# Patient Record
Sex: FEMALE | Race: WHITE | NOT HISPANIC OR LATINO | Employment: FULL TIME | URBAN - NONMETROPOLITAN AREA
[De-identification: names, ages, dates, MRNs, and addresses within clinical notes are randomized per-mention and may not be internally consistent; named-entity substitution may affect disease eponyms.]

---

## 2017-03-05 ENCOUNTER — HOSPITAL ENCOUNTER (EMERGENCY)
Facility: HOSPITAL | Age: 62
Discharge: HOME | End: 2017-03-05
Attending: EMERGENCY MEDICINE | Admitting: EMERGENCY MEDICINE

## 2017-03-05 VITALS
OXYGEN SATURATION: 99 % | HEIGHT: 64 IN | WEIGHT: 135 LBS | BODY MASS INDEX: 23.05 KG/M2 | DIASTOLIC BLOOD PRESSURE: 69 MMHG | HEART RATE: 70 BPM | RESPIRATION RATE: 16 BRPM | TEMPERATURE: 98 F | SYSTOLIC BLOOD PRESSURE: 158 MMHG

## 2017-03-05 DIAGNOSIS — E87.6 HYPOKALEMIA: ICD-10-CM

## 2017-03-05 DIAGNOSIS — K52.9 ACUTE GASTROENTERITIS: Primary | ICD-10-CM

## 2017-03-05 LAB
ALBUMIN SERPL-MCNC: 4.9 G/DL (ref 3.4–4.8)
ALBUMIN/GLOB SERPL: 1.3 G/DL (ref 1.5–2.5)
ALP SERPL-CCNC: 87 U/L (ref 35–104)
ALT SERPL W P-5'-P-CCNC: 16 U/L (ref 10–36)
AMPHET+METHAMPHET UR QL: NEGATIVE
AMYLASE SERPL-CCNC: 40 U/L (ref 28–100)
ANION GAP SERPL CALCULATED.3IONS-SCNC: 9.8 MMOL/L (ref 3.6–11.2)
AST SERPL-CCNC: 25 U/L (ref 10–30)
BACTERIA UR QL AUTO: ABNORMAL /HPF
BARBITURATES UR QL SCN: NEGATIVE
BASOPHILS # BLD AUTO: 0 10*3/MM3 (ref 0–0.3)
BASOPHILS NFR BLD AUTO: 0 % (ref 0–2)
BENZODIAZ UR QL SCN: POSITIVE
BILIRUB SERPL-MCNC: 0.7 MG/DL (ref 0.2–1.8)
BILIRUB UR QL STRIP: NEGATIVE
BUN BLD-MCNC: 17 MG/DL (ref 7–21)
BUN/CREAT SERPL: 21.3 (ref 7–25)
CALCIUM SPEC-SCNC: 10.6 MG/DL (ref 7.7–10)
CANNABINOIDS SERPL QL: POSITIVE
CHLORIDE SERPL-SCNC: 107 MMOL/L (ref 99–112)
CLARITY UR: CLEAR
CO2 SERPL-SCNC: 24.2 MMOL/L (ref 24.3–31.9)
COCAINE UR QL: NEGATIVE
COLOR UR: ABNORMAL
CREAT BLD-MCNC: 0.8 MG/DL (ref 0.43–1.29)
DEPRECATED RDW RBC AUTO: 38.5 FL (ref 37–54)
EOSINOPHIL # BLD AUTO: 0 10*3/MM3 (ref 0–0.7)
EOSINOPHIL NFR BLD AUTO: 0 % (ref 0–5)
ERYTHROCYTE [DISTWIDTH] IN BLOOD BY AUTOMATED COUNT: 12.6 % (ref 11.5–14.5)
FLUAV AG NPH QL: NEGATIVE
FLUBV AG NPH QL IA: NEGATIVE
GFR SERPL CREATININE-BSD FRML MDRD: 73 ML/MIN/1.73
GLOBULIN UR ELPH-MCNC: 3.7 GM/DL
GLUCOSE BLD-MCNC: 172 MG/DL (ref 70–110)
GLUCOSE UR STRIP-MCNC: ABNORMAL MG/DL
HCT VFR BLD AUTO: 41.1 % (ref 37–47)
HGB BLD-MCNC: 14.2 G/DL (ref 12–16)
HGB UR QL STRIP.AUTO: ABNORMAL
IMM GRANULOCYTES # BLD: 0.04 10*3/MM3 (ref 0–0.03)
IMM GRANULOCYTES NFR BLD: 0.3 % (ref 0–0.5)
KETONES UR QL STRIP: ABNORMAL
LEUKOCYTE ESTERASE UR QL STRIP.AUTO: ABNORMAL
LIPASE SERPL-CCNC: 27 U/L (ref 13–60)
LYMPHOCYTES # BLD AUTO: 0.82 10*3/MM3 (ref 1–3)
LYMPHOCYTES NFR BLD AUTO: 6 % (ref 21–51)
MCH RBC QN AUTO: 29.3 PG (ref 27–33)
MCHC RBC AUTO-ENTMCNC: 34.5 G/DL (ref 33–37)
MCV RBC AUTO: 84.7 FL (ref 80–94)
METHADONE UR QL SCN: NEGATIVE
MONOCYTES # BLD AUTO: 0.4 10*3/MM3 (ref 0.1–0.9)
MONOCYTES NFR BLD AUTO: 2.9 % (ref 0–10)
NEUTROPHILS # BLD AUTO: 12.36 10*3/MM3 (ref 1.4–6.5)
NEUTROPHILS NFR BLD AUTO: 90.8 % (ref 30–70)
NITRITE UR QL STRIP: NEGATIVE
OPIATES UR QL: POSITIVE
OSMOLALITY SERPL CALC.SUM OF ELEC: 286.9 MOSM/KG (ref 273–305)
PCP UR QL SCN: NEGATIVE
PH UR STRIP.AUTO: 6.5 [PH] (ref 5–8)
PLATELET # BLD AUTO: 240 10*3/MM3 (ref 130–400)
PMV BLD AUTO: 11.1 FL (ref 6–10)
POTASSIUM BLD-SCNC: 3.4 MMOL/L (ref 3.5–5.3)
PROPOXYPH UR QL: NEGATIVE
PROT SERPL-MCNC: 8.6 G/DL (ref 6–8)
PROT UR QL STRIP: ABNORMAL
RBC # BLD AUTO: 4.85 10*6/MM3 (ref 4.2–5.4)
RBC # UR: ABNORMAL /HPF
REF LAB TEST METHOD: ABNORMAL
SODIUM BLD-SCNC: 141 MMOL/L (ref 135–153)
SP GR UR STRIP: >1.03 (ref 1–1.03)
SQUAMOUS #/AREA URNS HPF: ABNORMAL /HPF
UROBILINOGEN UR QL STRIP: ABNORMAL
WBC NRBC COR # BLD: 13.62 10*3/MM3 (ref 4.5–12.5)
WBC UR QL AUTO: ABNORMAL /HPF

## 2017-03-05 PROCEDURE — 96361 HYDRATE IV INFUSION ADD-ON: CPT

## 2017-03-05 PROCEDURE — 96375 TX/PRO/DX INJ NEW DRUG ADDON: CPT

## 2017-03-05 PROCEDURE — 80307 DRUG TEST PRSMV CHEM ANLYZR: CPT | Performed by: EMERGENCY MEDICINE

## 2017-03-05 PROCEDURE — 99284 EMERGENCY DEPT VISIT MOD MDM: CPT

## 2017-03-05 PROCEDURE — 87086 URINE CULTURE/COLONY COUNT: CPT | Performed by: EMERGENCY MEDICINE

## 2017-03-05 PROCEDURE — 80053 COMPREHEN METABOLIC PANEL: CPT | Performed by: EMERGENCY MEDICINE

## 2017-03-05 PROCEDURE — 25010000002 LORAZEPAM PER 2 MG: Performed by: EMERGENCY MEDICINE

## 2017-03-05 PROCEDURE — 81001 URINALYSIS AUTO W/SCOPE: CPT | Performed by: EMERGENCY MEDICINE

## 2017-03-05 PROCEDURE — 82150 ASSAY OF AMYLASE: CPT | Performed by: EMERGENCY MEDICINE

## 2017-03-05 PROCEDURE — 25010000002 ONDANSETRON PER 1 MG: Performed by: EMERGENCY MEDICINE

## 2017-03-05 PROCEDURE — 96365 THER/PROPH/DIAG IV INF INIT: CPT

## 2017-03-05 PROCEDURE — 87804 INFLUENZA ASSAY W/OPTIC: CPT | Performed by: EMERGENCY MEDICINE

## 2017-03-05 PROCEDURE — 36415 COLL VENOUS BLD VENIPUNCTURE: CPT

## 2017-03-05 PROCEDURE — 83690 ASSAY OF LIPASE: CPT | Performed by: EMERGENCY MEDICINE

## 2017-03-05 PROCEDURE — 85025 COMPLETE CBC W/AUTO DIFF WBC: CPT | Performed by: EMERGENCY MEDICINE

## 2017-03-05 PROCEDURE — 25010000002 PROMETHAZINE PER 50 MG: Performed by: EMERGENCY MEDICINE

## 2017-03-05 RX ORDER — POTASSIUM CHLORIDE 20 MEQ/1
40 TABLET, EXTENDED RELEASE ORAL DAILY
Status: DISCONTINUED | OUTPATIENT
Start: 2017-03-05 | End: 2017-03-05 | Stop reason: HOSPADM

## 2017-03-05 RX ORDER — SODIUM CHLORIDE 0.9 % (FLUSH) 0.9 %
10 SYRINGE (ML) INJECTION AS NEEDED
Status: DISCONTINUED | OUTPATIENT
Start: 2017-03-05 | End: 2017-03-05 | Stop reason: HOSPADM

## 2017-03-05 RX ORDER — PROMETHAZINE HYDROCHLORIDE 25 MG/1
25 TABLET ORAL EVERY 6 HOURS PRN
Qty: 12 TABLET | Refills: 0 | Status: SHIPPED | OUTPATIENT
Start: 2017-03-05 | End: 2022-09-26 | Stop reason: SDUPTHER

## 2017-03-05 RX ORDER — SODIUM CHLORIDE 9 MG/ML
125 INJECTION, SOLUTION INTRAVENOUS CONTINUOUS
Status: DISCONTINUED | OUTPATIENT
Start: 2017-03-05 | End: 2017-03-05 | Stop reason: HOSPADM

## 2017-03-05 RX ORDER — ONDANSETRON 2 MG/ML
4 INJECTION INTRAMUSCULAR; INTRAVENOUS ONCE
Status: COMPLETED | OUTPATIENT
Start: 2017-03-05 | End: 2017-03-05

## 2017-03-05 RX ORDER — LORAZEPAM 2 MG/ML
1 INJECTION INTRAMUSCULAR ONCE
Status: COMPLETED | OUTPATIENT
Start: 2017-03-05 | End: 2017-03-05

## 2017-03-05 RX ADMIN — POTASSIUM CHLORIDE 40 MEQ: 1500 TABLET, EXTENDED RELEASE ORAL at 09:40

## 2017-03-05 RX ADMIN — LORAZEPAM 1 MG: 2 INJECTION INTRAMUSCULAR; INTRAVENOUS at 09:37

## 2017-03-05 RX ADMIN — SODIUM CHLORIDE 125 ML/HR: 9 INJECTION, SOLUTION INTRAVENOUS at 07:48

## 2017-03-05 RX ADMIN — ONDANSETRON 4 MG: 2 INJECTION INTRAMUSCULAR; INTRAVENOUS at 07:04

## 2017-03-05 RX ADMIN — PROMETHAZINE HYDROCHLORIDE 12.5 MG: 25 INJECTION INTRAMUSCULAR; INTRAVENOUS at 07:30

## 2017-03-05 RX ADMIN — SODIUM CHLORIDE 500 ML: 9 INJECTION, SOLUTION INTRAVENOUS at 07:04

## 2017-03-05 NOTE — ED PROVIDER NOTES
Subjective   HPI Comments: Pt comes in with back pain, NVD since last night.  No fever.  Subjective chills.    Patient is a 61 y.o. female presenting with vomiting.   History provided by:  Patient and parent  Vomiting   The primary symptoms include abdominal pain, nausea, vomiting and diarrhea. Primary symptoms do not include fever, weight loss, fatigue, melena, hematemesis, jaundice, hematochezia, dysuria, myalgias, arthralgias or rash. The illness began yesterday.   The illness is also significant for anorexia and back pain. The illness does not include chills, dysphagia, odynophagia, bloating, constipation, tenesmus or itching. Associated medical issues do not include inflammatory bowel disease, GERD, gallstones, liver disease, alcohol abuse, PUD, gastric bypass, bowel resection, irritable bowel syndrome, hemorrhoids or diverticulitis.       Review of Systems   Constitutional: Negative.  Negative for chills, fatigue, fever and weight loss.   HENT: Negative.    Eyes: Negative.    Respiratory: Negative.    Cardiovascular: Negative.    Gastrointestinal: Positive for abdominal pain, anorexia, diarrhea, nausea and vomiting. Negative for bloating, constipation, dysphagia, hematemesis, hematochezia, jaundice and melena.   Endocrine: Negative.    Genitourinary: Negative.  Negative for dysuria.   Musculoskeletal: Positive for back pain. Negative for arthralgias and myalgias.   Skin: Negative.  Negative for itching and rash.   Allergic/Immunologic: Negative.    Neurological: Negative.    Hematological: Negative.    Psychiatric/Behavioral: Negative.    All other systems reviewed and are negative.      Past Medical History   Diagnosis Date   • COPD (chronic obstructive pulmonary disease)    • Coronary artery disease        Allergies   Allergen Reactions   • Penicillins        History reviewed. No pertinent past surgical history.    History reviewed. No pertinent family history.    Social History     Social History   •  Marital status:      Spouse name: N/A   • Number of children: N/A   • Years of education: N/A     Social History Main Topics   • Smoking status: Former Smoker   • Smokeless tobacco: None      Comment: pt states that she smokes an e-cig.   • Alcohol use No   • Drug use: Yes      Comment: Pt denies drug use but states that she has taken xanax , neurontin and pain pill that is not prescribed to her   • Sexual activity: Defer     Other Topics Concern   • None     Social History Narrative   • None           Objective   Physical Exam   Constitutional: She is oriented to person, place, and time. She appears well-developed and well-nourished. No distress.   HENT:   Head: Normocephalic and atraumatic.   Nose: Nose normal.   Moist mucus membranes   Eyes: Conjunctivae and EOM are normal. Right eye exhibits no discharge. Left eye exhibits no discharge. No scleral icterus.   Neck: Normal range of motion. Neck supple. No JVD present. No tracheal deviation present. No thyromegaly present.   Cardiovascular: Normal rate, regular rhythm, normal heart sounds and intact distal pulses.  Exam reveals no gallop and no friction rub.    No murmur heard.  Pulmonary/Chest: Effort normal and breath sounds normal. No stridor. No respiratory distress. She has no wheezes. She has no rales. She exhibits no tenderness.   Abdominal: Soft. She exhibits no distension and no mass. There is tenderness. There is no guarding.   Diffuse tenderness to palpation   Musculoskeletal: Normal range of motion. She exhibits no deformity.   Lymphadenopathy:     She has no cervical adenopathy.   Neurological: She is alert and oriented to person, place, and time. She exhibits normal muscle tone. Coordination normal.   Skin: Skin is warm and dry. No pallor.   Psychiatric: Her behavior is normal.   Nursing note and vitals reviewed.      Procedures         ED Course  ED Course   Comment By Time   Endorsed to Dr Cowan, labs in progress Kip Choe MD 03/05  0745                  [facility]    Final diagnoses:   None            Kip Choe MD  03/05/17 0857

## 2017-03-05 NOTE — ED NOTES
Pt has had no adverse reaction to medication, pt reports nausea is still present and not improved, md made aware. Pt alert and oriented, skin pwd, no respiratory distress, no n/v/d at this time. Pt rates pain 8/10, reports all over body pain.      Rose Mary Red, RN  03/05/17 3145

## 2017-03-05 NOTE — ED NOTES
Pt status improved, pt has had no adverse reaction to medication, pt rates all over body pain 4/10. Pt alert and oriented, pt complains nausea is still present but improving, skin pwd, no respiratory distress noted. Pt status improved.      Rose Mary Red RN  03/05/17 1000

## 2017-03-05 NOTE — ED PROVIDER NOTES
"Subjective   History of Present Illness    Review of Systems    Past Medical History   Diagnosis Date   • COPD (chronic obstructive pulmonary disease)    • Coronary artery disease        Allergies   Allergen Reactions   • Penicillins        History reviewed. No pertinent past surgical history.    History reviewed. No pertinent family history.    Social History     Social History   • Marital status:      Spouse name: N/A   • Number of children: N/A   • Years of education: N/A     Social History Main Topics   • Smoking status: Former Smoker   • Smokeless tobacco: None      Comment: pt states that she smokes an e-cig.   • Alcohol use No   • Drug use: Yes      Comment: Pt denies drug use but states that she has taken xanax , neurontin and pain pill that is not prescribed to her   • Sexual activity: Defer     Other Topics Concern   • None     Social History Narrative   • None           Objective   Physical Exam    Procedures         ED Course  ED Course   Comment By Time   Endorsed to Dr Cowan, labs in progress Kip Choe MD 03/05 7382   Have discussed with patient and her mother.  Patient states that she is not prescribed benzodiazepines or opiates, but she \"gets them when she needs them\".  She states that she has had withdrawal in the past and the symptoms she has now are different from previous withdrawal symptoms.  Workup shows mildly elevated WBCs and glucose with mildly decreased potassium.  Urinalysis consistent with contamination.  Drug screen noted. Nitish Cowan MD 03/05 4453                  MDM  Number of Diagnoses or Management Options  Acute gastroenteritis:   Hypokalemia:      Amount and/or Complexity of Data Reviewed  Clinical lab tests: reviewed and ordered  Tests in the radiology section of CPT®: reviewed and ordered    Risk of Complications, Morbidity, and/or Mortality  Presenting problems: high  Diagnostic procedures: high  Management options: high        Final diagnoses:   Acute " gastroenteritis   Hypokalemia            Nitish Cowan MD  03/05/17 0934

## 2017-03-05 NOTE — ED NOTES
Pt states she has been having vomiting and diarrhea since 11 pm last night. C/o generalized ABD tenderness     Kaela Reyes RN  03/05/17 0763

## 2017-03-05 NOTE — ED NOTES
Pt skin pwd, no respiratory distress, no vomiting, pt alert and oriented, pt complains of chills, legs shaking, and pain all over. Fall precautions maintained, family at bedside. Waiting on labs to result.      Rose Mary Red RN  03/05/17 2952

## 2017-03-07 LAB — BACTERIA SPEC AEROBE CULT: NORMAL

## 2021-03-01 ENCOUNTER — IMMUNIZATION (OUTPATIENT)
Dept: VACCINE CLINIC | Facility: HOSPITAL | Age: 66
End: 2021-03-01

## 2021-03-01 PROCEDURE — 91300 HC SARSCOV02 VAC 30MCG/0.3ML IM: CPT | Performed by: INTERNAL MEDICINE

## 2021-03-01 PROCEDURE — 0001A: CPT | Performed by: INTERNAL MEDICINE

## 2021-03-22 ENCOUNTER — IMMUNIZATION (OUTPATIENT)
Dept: VACCINE CLINIC | Facility: HOSPITAL | Age: 66
End: 2021-03-22

## 2021-03-22 PROCEDURE — 0002A: CPT | Performed by: INTERNAL MEDICINE

## 2021-03-22 PROCEDURE — 91300 HC SARSCOV02 VAC 30MCG/0.3ML IM: CPT | Performed by: INTERNAL MEDICINE

## 2021-05-19 ENCOUNTER — APPOINTMENT (OUTPATIENT)
Dept: CT IMAGING | Facility: HOSPITAL | Age: 66
End: 2021-05-19

## 2021-05-19 ENCOUNTER — HOSPITAL ENCOUNTER (EMERGENCY)
Facility: HOSPITAL | Age: 66
Discharge: HOME | End: 2021-05-19
Attending: EMERGENCY MEDICINE | Admitting: EMERGENCY MEDICINE

## 2021-05-19 VITALS
HEART RATE: 92 BPM | TEMPERATURE: 98.2 F | SYSTOLIC BLOOD PRESSURE: 158 MMHG | BODY MASS INDEX: 25.76 KG/M2 | WEIGHT: 140 LBS | OXYGEN SATURATION: 97 % | HEIGHT: 62 IN | DIASTOLIC BLOOD PRESSURE: 98 MMHG | RESPIRATION RATE: 17 BRPM

## 2021-05-19 DIAGNOSIS — N39.0 BACTERIAL UTI: Primary | ICD-10-CM

## 2021-05-19 DIAGNOSIS — A49.9 BACTERIAL UTI: Primary | ICD-10-CM

## 2021-05-19 DIAGNOSIS — R56.9 SEIZURE (HCC): ICD-10-CM

## 2021-05-19 DIAGNOSIS — F19.10 POLYSUBSTANCE ABUSE (HCC): ICD-10-CM

## 2021-05-19 LAB
6-ACETYL MORPHINE: NEGATIVE
ALBUMIN SERPL-MCNC: 4.09 G/DL (ref 3.5–5.2)
ALBUMIN/GLOB SERPL: 1.2 G/DL
ALP SERPL-CCNC: 82 U/L (ref 39–117)
ALT SERPL W P-5'-P-CCNC: 7 U/L (ref 1–33)
AMPHET+METHAMPHET UR QL: NEGATIVE
ANION GAP SERPL CALCULATED.3IONS-SCNC: 14.1 MMOL/L (ref 5–15)
AST SERPL-CCNC: 17 U/L (ref 1–32)
BACTERIA UR QL AUTO: ABNORMAL /HPF
BARBITURATES UR QL SCN: NEGATIVE
BASOPHILS # BLD AUTO: 0.02 10*3/MM3 (ref 0–0.2)
BASOPHILS NFR BLD AUTO: 0.1 % (ref 0–1.5)
BENZODIAZ UR QL SCN: POSITIVE
BILIRUB SERPL-MCNC: 0.2 MG/DL (ref 0–1.2)
BILIRUB UR QL STRIP: NEGATIVE
BUN SERPL-MCNC: 24 MG/DL (ref 8–23)
BUN/CREAT SERPL: 19.7 (ref 7–25)
BUPRENORPHINE SERPL-MCNC: POSITIVE NG/ML
CALCIUM SPEC-SCNC: 9.3 MG/DL (ref 8.6–10.5)
CANNABINOIDS SERPL QL: POSITIVE
CHLORIDE SERPL-SCNC: 100 MMOL/L (ref 98–107)
CLARITY UR: CLEAR
CO2 SERPL-SCNC: 22.9 MMOL/L (ref 22–29)
COCAINE UR QL: NEGATIVE
COLOR UR: YELLOW
CREAT SERPL-MCNC: 1.22 MG/DL (ref 0.57–1)
D-LACTATE SERPL-SCNC: 0.7 MMOL/L (ref 0.5–2)
DEPRECATED RDW RBC AUTO: 40.4 FL (ref 37–54)
EOSINOPHIL # BLD AUTO: 0.02 10*3/MM3 (ref 0–0.4)
EOSINOPHIL NFR BLD AUTO: 0.1 % (ref 0.3–6.2)
ERYTHROCYTE [DISTWIDTH] IN BLOOD BY AUTOMATED COUNT: 12.6 % (ref 12.3–15.4)
ETHANOL BLD-MCNC: <10 MG/DL (ref 0–10)
ETHANOL UR QL: <0.01 %
GFR SERPL CREATININE-BSD FRML MDRD: 44 ML/MIN/1.73
GLOBULIN UR ELPH-MCNC: 3.5 GM/DL
GLUCOSE BLDC GLUCOMTR-MCNC: 131 MG/DL (ref 70–130)
GLUCOSE SERPL-MCNC: 127 MG/DL (ref 65–99)
GLUCOSE UR STRIP-MCNC: NEGATIVE MG/DL
HCT VFR BLD AUTO: 38.5 % (ref 34–46.6)
HGB BLD-MCNC: 12.8 G/DL (ref 12–15.9)
HGB UR QL STRIP.AUTO: ABNORMAL
HYALINE CASTS UR QL AUTO: ABNORMAL /LPF
IMM GRANULOCYTES # BLD AUTO: 0.06 10*3/MM3 (ref 0–0.05)
IMM GRANULOCYTES NFR BLD AUTO: 0.4 % (ref 0–0.5)
KETONES UR QL STRIP: NEGATIVE
LEUKOCYTE ESTERASE UR QL STRIP.AUTO: ABNORMAL
LYMPHOCYTES # BLD AUTO: 1.62 10*3/MM3 (ref 0.7–3.1)
LYMPHOCYTES NFR BLD AUTO: 11.8 % (ref 19.6–45.3)
MCH RBC QN AUTO: 29 PG (ref 26.6–33)
MCHC RBC AUTO-ENTMCNC: 33.2 G/DL (ref 31.5–35.7)
MCV RBC AUTO: 87.3 FL (ref 79–97)
METHADONE UR QL SCN: NEGATIVE
MONOCYTES # BLD AUTO: 1.12 10*3/MM3 (ref 0.1–0.9)
MONOCYTES NFR BLD AUTO: 8.2 % (ref 5–12)
NEUTROPHILS NFR BLD AUTO: 10.89 10*3/MM3 (ref 1.7–7)
NEUTROPHILS NFR BLD AUTO: 79.4 % (ref 42.7–76)
NITRITE UR QL STRIP: POSITIVE
NRBC BLD AUTO-RTO: 0 /100 WBC (ref 0–0.2)
OPIATES UR QL: NEGATIVE
OXYCODONE UR QL SCN: NEGATIVE
PCP UR QL SCN: NEGATIVE
PH UR STRIP.AUTO: 5.5 [PH] (ref 5–8)
PLATELET # BLD AUTO: 226 10*3/MM3 (ref 140–450)
PMV BLD AUTO: 10.2 FL (ref 6–12)
POTASSIUM SERPL-SCNC: 3.4 MMOL/L (ref 3.5–5.2)
PROT SERPL-MCNC: 7.6 G/DL (ref 6–8.5)
PROT UR QL STRIP: ABNORMAL
RBC # BLD AUTO: 4.41 10*6/MM3 (ref 3.77–5.28)
RBC # UR: ABNORMAL /HPF
REF LAB TEST METHOD: ABNORMAL
SODIUM SERPL-SCNC: 137 MMOL/L (ref 136–145)
SP GR UR STRIP: 1.02 (ref 1–1.03)
SQUAMOUS #/AREA URNS HPF: ABNORMAL /HPF
UROBILINOGEN UR QL STRIP: ABNORMAL
WBC # BLD AUTO: 13.73 10*3/MM3 (ref 3.4–10.8)
WBC UR QL AUTO: ABNORMAL /HPF

## 2021-05-19 PROCEDURE — 36415 COLL VENOUS BLD VENIPUNCTURE: CPT

## 2021-05-19 PROCEDURE — 80307 DRUG TEST PRSMV CHEM ANLYZR: CPT | Performed by: EMERGENCY MEDICINE

## 2021-05-19 PROCEDURE — 87086 URINE CULTURE/COLONY COUNT: CPT | Performed by: EMERGENCY MEDICINE

## 2021-05-19 PROCEDURE — 80053 COMPREHEN METABOLIC PANEL: CPT | Performed by: EMERGENCY MEDICINE

## 2021-05-19 PROCEDURE — 87040 BLOOD CULTURE FOR BACTERIA: CPT | Performed by: EMERGENCY MEDICINE

## 2021-05-19 PROCEDURE — 93010 ELECTROCARDIOGRAM REPORT: CPT | Performed by: INTERNAL MEDICINE

## 2021-05-19 PROCEDURE — 93005 ELECTROCARDIOGRAM TRACING: CPT | Performed by: NURSE PRACTITIONER

## 2021-05-19 PROCEDURE — 96375 TX/PRO/DX INJ NEW DRUG ADDON: CPT

## 2021-05-19 PROCEDURE — 25010000002 CEFTRIAXONE PER 250 MG: Performed by: EMERGENCY MEDICINE

## 2021-05-19 PROCEDURE — 81001 URINALYSIS AUTO W/SCOPE: CPT | Performed by: EMERGENCY MEDICINE

## 2021-05-19 PROCEDURE — 82962 GLUCOSE BLOOD TEST: CPT

## 2021-05-19 PROCEDURE — 82077 ASSAY SPEC XCP UR&BREATH IA: CPT | Performed by: EMERGENCY MEDICINE

## 2021-05-19 PROCEDURE — 99285 EMERGENCY DEPT VISIT HI MDM: CPT

## 2021-05-19 PROCEDURE — 87088 URINE BACTERIA CULTURE: CPT | Performed by: EMERGENCY MEDICINE

## 2021-05-19 PROCEDURE — 87186 SC STD MICRODIL/AGAR DIL: CPT | Performed by: EMERGENCY MEDICINE

## 2021-05-19 PROCEDURE — 70450 CT HEAD/BRAIN W/O DYE: CPT | Performed by: RADIOLOGY

## 2021-05-19 PROCEDURE — 83605 ASSAY OF LACTIC ACID: CPT | Performed by: EMERGENCY MEDICINE

## 2021-05-19 PROCEDURE — 96365 THER/PROPH/DIAG IV INF INIT: CPT

## 2021-05-19 PROCEDURE — 70450 CT HEAD/BRAIN W/O DYE: CPT

## 2021-05-19 PROCEDURE — 85025 COMPLETE CBC W/AUTO DIFF WBC: CPT | Performed by: EMERGENCY MEDICINE

## 2021-05-19 PROCEDURE — 25010000002 MORPHINE PER 10 MG: Performed by: EMERGENCY MEDICINE

## 2021-05-19 RX ORDER — PROMETHAZINE HYDROCHLORIDE 25 MG/1
25 TABLET ORAL EVERY 6 HOURS PRN
Qty: 30 TABLET | Refills: 0 | Status: SHIPPED | OUTPATIENT
Start: 2021-05-19 | End: 2022-09-26 | Stop reason: SDUPTHER

## 2021-05-19 RX ORDER — CEFDINIR 300 MG/1
300 CAPSULE ORAL 2 TIMES DAILY
Qty: 20 CAPSULE | Refills: 0 | Status: SHIPPED | OUTPATIENT
Start: 2021-05-19 | End: 2022-10-05

## 2021-05-19 RX ORDER — HYDROCODONE BITARTRATE AND ACETAMINOPHEN 5; 325 MG/1; MG/1
1 TABLET ORAL EVERY 6 HOURS PRN
Qty: 10 TABLET | Refills: 0 | Status: SHIPPED | OUTPATIENT
Start: 2021-05-19 | End: 2022-08-09

## 2021-05-19 RX ORDER — SODIUM CHLORIDE 0.9 % (FLUSH) 0.9 %
10 SYRINGE (ML) INJECTION AS NEEDED
Status: DISCONTINUED | OUTPATIENT
Start: 2021-05-19 | End: 2021-05-20 | Stop reason: HOSPADM

## 2021-05-19 RX ADMIN — MORPHINE SULFATE 4 MG: 4 INJECTION, SOLUTION INTRAMUSCULAR; INTRAVENOUS at 22:05

## 2021-05-19 RX ADMIN — CEFTRIAXONE 1 G: 1 INJECTION, POWDER, FOR SOLUTION INTRAMUSCULAR; INTRAVENOUS at 20:47

## 2021-05-20 LAB
QT INTERVAL: 304 MS
QTC INTERVAL: 447 MS

## 2021-05-21 LAB — BACTERIA SPEC AEROBE CULT: ABNORMAL

## 2021-05-24 LAB
BACTERIA SPEC AEROBE CULT: NORMAL
BACTERIA SPEC AEROBE CULT: NORMAL

## 2022-08-09 ENCOUNTER — OFFICE VISIT (OUTPATIENT)
Dept: PSYCHIATRY | Facility: CLINIC | Age: 67
End: 2022-08-09

## 2022-08-09 VITALS
BODY MASS INDEX: 25.53 KG/M2 | DIASTOLIC BLOOD PRESSURE: 99 MMHG | SYSTOLIC BLOOD PRESSURE: 176 MMHG | HEART RATE: 99 BPM | WEIGHT: 139.6 LBS

## 2022-08-09 DIAGNOSIS — F11.20 OPIOID TYPE DEPENDENCE, CONTINUOUS: Primary | ICD-10-CM

## 2022-08-09 DIAGNOSIS — Z79.899 MEDICATION MANAGEMENT: ICD-10-CM

## 2022-08-09 LAB
EXTERNAL AMPHETAMINE SCREEN URINE: NEGATIVE
EXTERNAL BENZODIAZEPINE SCREEN URINE: NEGATIVE
EXTERNAL BUPRENORPHINE SCREEN URINE: NEGATIVE
EXTERNAL COCAINE SCREEN URINE: NEGATIVE
EXTERNAL MDMA: NEGATIVE
EXTERNAL METHADONE SCREEN URINE: NEGATIVE
EXTERNAL METHAMPHETAMINE SCREEN URINE: NEGATIVE
EXTERNAL OPIATES SCREEN URINE: NEGATIVE
EXTERNAL OXYCODONE SCREEN URINE: NEGATIVE
EXTERNAL THC SCREEN URINE: POSITIVE

## 2022-08-09 PROCEDURE — 90792 PSYCH DIAG EVAL W/MED SRVCS: CPT | Performed by: NURSE PRACTITIONER

## 2022-08-09 RX ORDER — BUPRENORPHINE HYDROCHLORIDE AND NALOXONE HYDROCHLORIDE DIHYDRATE 8; 2 MG/1; MG/1
1 TABLET SUBLINGUAL DAILY
Qty: 15 TABLET | Refills: 0 | Status: SHIPPED | OUTPATIENT
Start: 2022-08-09 | End: 2022-08-23 | Stop reason: SDUPTHER

## 2022-08-09 RX ORDER — NALOXONE HYDROCHLORIDE 4 MG/.1ML
1 SPRAY NASAL AS NEEDED
Qty: 2 EACH | Refills: 2 | Status: SHIPPED | OUTPATIENT
Start: 2022-08-09

## 2022-08-09 NOTE — PROGRESS NOTES
This provider is located at [facility]. The Patient is seen remotely located at the [facility] ([facility]) using Video. Patient is being seen via telehealth and confirm that they are in a secure environment for this session. The patient's condition being diagnosed/treated is appropriate for telemedicine. Provider identified as Champ Escalante as well as credentials APRN NIKOS FNP-C VANIA-WILLIAM.   The client/patient gave consent to be seen remotely, and when consent is given they understand that the consent allows for patient identifiable information to be sent to a third party as needed.   They may refuse to be seen remotely at any time. The electronic data is encrypted and password protected, and the patient has been advised of the potential risks to privacy not withstanding such measures.    Initial Evaluation: Chief Complaint/History of Present Illness: Patient presents for initial evaluation requesting buprenorphine/naloxone for opiate pain tablet use.  Opiate pain tablet onset early 30s after an injury-use continued post injury and escalated and developed into a dependence and chronic use; patient reports she typically consumes 6 or 7 hydrocodone and reports same quantity of use yesterday; patient reports she had 1 hydrocodone this morning.  As it relates to medication assisted treatment patient reports she has had a positive therapeutic benefit from Suboxone both prescribed and nonprescribed; most recently patient has been acquiring buprenorphine from nonprescribed sources with last dose today; patient reports she took a piece of a Suboxone tablet hours after taking the last dose of hydrocodone today; patient denies any symptoms associated with precipitated withdrawal; today's point of care urinalysis does not reflect this history  -Offered a medically supervised buprenorphine induction tomorrow as the safest option to initiate therapy; patient reports significant opiate cravings at this time and  is highly concerned she will relapse on illicit opiates; in the spirit of risk reduction we will plan for a home induction tonight; reviewed appropriate methods of sublingual buprenorphine dosing; I also advised patient to take no more than buprenorphine 2 mg as her first dose and to wait 4 hours and as long as she does not feel symptoms of precipitated withdrawal take another 2 mg dose for a total of 4 mg today; advised patient tomorrow to continue 8 mg daily in split dosing twice daily and will follow patient closely  -Patient reports use of THC and denied all other illicit substances including alcohol or other CNS depressants    Urine Drug Screen (today's visit) discussed: Positive THC otherwise negative for substances tested    PAULETTE (PDMP) Reviewed for Current/Active Medications: No controlled medications dispensed within the past 12 months per Paulette review    Past Surgical History:  Past Surgical History:   Procedure Laterality Date   • GALLBLADDER SURGERY     • HYSTERECTOMY         Problem List:  There is no problem list on file for this patient.      Allergy:   Allergies   Allergen Reactions   • Penicillins         Current Medications:   Current Outpatient Medications   Medication Sig Dispense Refill   • buprenorphine-naloxone (SUBOXONE) 8-2 MG per SL tablet Place 1 tablet under the tongue Daily. 15 tablet 0   • cefdinir (OMNICEF) 300 MG capsule Take 1 capsule by mouth 2 (Two) Times a Day. 20 capsule 0   • losartan (COZAAR) 100 MG tablet Take 10 mg by mouth Daily.     • naloxone (NARCAN) 4 MG/0.1ML nasal spray 1 spray into the nostril(s) as directed by provider As Needed (opiate over sedation). 1 spray in 1 nostril every 2 to 3 minutes call 911 2 each 2   • nitrofurantoin, macrocrystal-monohydrate, (MACROBID) 100 MG capsule Take 1 capsule by mouth 2 (Two) Times a Day. 14 capsule 0   • omeprazole (priLOSEC) 20 MG capsule Take 1 capsule by mouth 2 (Two) Times a Day. 30 capsule 0   • promethazine (PHENERGAN)  25 MG tablet Take 1 tablet by mouth Every 6 (Six) Hours As Needed for nausea or vomiting. 12 tablet 0   • promethazine (PHENERGAN) 25 MG tablet Take 1 tablet by mouth Every 6 (Six) Hours As Needed for Vomiting. 30 tablet 0   • rosuvastatin (CRESTOR) 20 MG tablet Take 20 mg by mouth Daily.       No current facility-administered medications for this visit.       Past Medical History:  Past Medical History:   Diagnosis Date   • Arthritis    • Fibromyalgia    • H/O degenerative disc disease          Social History     Socioeconomic History   • Marital status:    Tobacco Use   • Smoking status: Current Some Day Smoker     Packs/day: 0.25     Years: 15.00     Pack years: 3.75     Types: Cigarettes   • Smokeless tobacco: Never Used   • Tobacco comment: pt states that she smokes an e-cig.   Vaping Use   • Vaping Use: Former   • Substances: Nicotine   • Devices: Disposable   Substance and Sexual Activity   • Alcohol use: No   • Drug use: Yes     Types: Hydrocodone, Benzodiazepines     Comment: formerly abused prescribed medications   • Sexual activity: Defer       Family History:   Family History of Substance/Alcohol use:      Family History   Problem Relation Age of Onset   • Alcohol abuse Father    • Mental illness Maternal Grandfather          Mental Status Exam:   Hygiene:   good  Cooperation:  Cooperative  Eye Contact:  Good  Psychomotor Behavior:  Appropriate  Affect:  Appropriate  Mood: normal  Hopelessness: Denies  Speech:  Normal  Thought Process:  Goal directed  Thought Content:  Normal  Suicidal:  None  Homicidal:  None  Hallucinations:  None  Delusion:  None  Memory:  Intact  Orientation:  Grossly intact  Reliability:  good  Insight:  Good  Judgement:  Good  Impulse Control:  Good  Physical/Medical Issues:  No        Diagnostic Criteria for Substance Use Disorder (JUAN)    Impaired Control over Substance Use  -Use of substance in increasing amounts and/or increasing periods of time  -Desire to cut down or  quite but unable  -Significant amount of time procuring/using/recovering from use of substance  -Cravings, particularly around triggers    Social Impairment  -Obligations at home/school/work failed/neglected  -Social/occupational/recreational activities abandoned  -Continues use despite adverse interpersonal/social consequences    Risky Use of Substance  -Use of substance in situations that are dangerous (ex IV use, driving intoxicated)  -Continued use despite knowledge of a psychological and/or physical problem which will develop or worsen with use    Pharmacological Criteria  -Tolerance  -Withdrawal (dependence)     Level of JUAN  Mild JUAN: 2-3 Criteria  Moderate JUAN: 4-5 Criteria  Severe JUAN: 6 or more Criteria            Review of Systems:  Review of Systems   Constitutional: Negative for activity change, chills, diaphoresis and fatigue.   Respiratory: Negative for apnea, cough and shortness of breath.    Cardiovascular: Negative for chest pain, palpitations and leg swelling.   Gastrointestinal: Negative for abdominal pain, constipation, diarrhea, nausea and vomiting.   Genitourinary: Negative for difficulty urinating.   Musculoskeletal: Negative for arthralgias.   Skin: Negative for rash.   Neurological: Negative for dizziness, weakness and headaches.   Psychiatric/Behavioral: Negative for agitation, self-injury, sleep disturbance and suicidal ideas. The patient is not nervous/anxious.          Physical Exam:  Physical Exam  Vitals reviewed.   Constitutional:       General: She is not in acute distress.     Appearance: Normal appearance. She is not ill-appearing or toxic-appearing.   Pulmonary:      Effort: Pulmonary effort is normal.   Musculoskeletal:         General: Normal range of motion.   Neurological:      General: No focal deficit present.      Mental Status: She is alert and oriented to person, place, and time.   Psychiatric:         Attention and Perception: Attention and perception normal.          Mood and Affect: Mood normal. Mood is not anxious or depressed.         Speech: Speech normal.         Behavior: Behavior normal. Behavior is cooperative.         Thought Content: Thought content normal.         Cognition and Memory: Cognition and memory normal.         Judgment: Judgment normal.         Vital Signs:   /99   Pulse 99   Wt 63.3 kg (139 lb 9.6 oz)   BMI 25.53 kg/m²      Lab Results:   Office Visit on 08/09/2022   Component Date Value Ref Range Status   • External Amphetamine Screen Urine 08/09/2022 Negative   Final-Edited   • External Benzodiazepine Screen Uri* 08/09/2022 Negative   Final-Edited   • External Cocaine Screen Urine 08/09/2022 Negative   Final-Edited   • External THC Screen Urine 08/09/2022 Positive (A)  Corrected   • External Methadone Screen Urine 08/09/2022 Negative   Final-Edited   • External Methamphetamine Screen Ur* 08/09/2022 Negative   Final-Edited   • External Oxycodone Screen Urine 08/09/2022 Negative   Final-Edited   • External Buprenorphine Screen Urine 08/09/2022 Negative   Corrected   • External MDMA 08/09/2022 Negative   Final-Edited   • External Opiates Screen Urine 08/09/2022 Negative   Final-Edited           Assessment & Plan   Diagnoses and all orders for this visit:    1. Opioid type dependence, continuous (HCC) (Primary)  -     naloxone (NARCAN) 4 MG/0.1ML nasal spray; Use 1 spray in the nostril as directed by provider As Needed (opiate over sedation). Use 1 spray in 1 nostril every 2 to 3 minutes. Call 911  Dispense: 2 each; Refill: 2  -     buprenorphine-naloxone (SUBOXONE) 8-2 MG per SL tablet; Place 1 tablet under the tongue Daily.  Dispense: 15 tablet; Refill: 0    2. Medication management  -     [facility] Drug Screen  -     CBC & Differential; Future  -     Comprehensive Metabolic Panel; Future  -     HIV-1 / O / 2 Ag / Antibody 4th Generation  -     HCV RNA By PCR, Qn Rfx Susy; Future        Visit Diagnoses:    ICD-10-CM ICD-9-CM   1. Opioid type  dependence, continuous ([facility])  F11.20 304.01   2. Medication management  Z79.899 V58.69       PLAN:  Review Expectations for care in Medicated Assisted Treatment     Treatment Plan:   Medication management is only one component of treatment. To maximize the potential for treatment success it is necessary to take part in 1:1 counselling and or 12 Step Facilitation in which you maintain an active relationship with a sponsor or . Verification/Proof of active involvement with 1:1 counselling or 12 Step Facilitation may be required as a component of the treatment plan.    Clinic expectations:     Visit Frequency: New client Progression- Weekly x 2 months (8 weeks/visits), Biweekly x 2 months (4 visits), monthly thereafter.  Visit frequency is dependent upon drug screen analysis and treatment plan compliance; Required interval between clinic visits may be shortened or increased.    Drug Screen: You will be required to provide a drug screen at each clinic evaluation; if unable/unwilling you may be asked to reschedule and will not see the provider. Supervised urine collections are required.     Appointments: You will be required to set an appointment for your clinic evaluations. These evaluation appointments must be maintained as scheduled; If you are more than 15 minutes late for a scheduled appointment you may be asked to reschedule and will not see a provider that day.     Random Evaluations: Random evaluations are essential for accountability in Medicated Assisted Treatment as well as a [place] law requirement. Thus, a working phone number must be maintained. If called for a Random evaluation, you have 24 hours to report to the clinic (during normal operating business hours) with your medication and pill bottle for assessment as well as a drug screen.  Clinic staff must have means to contact you.     Medication Management:    Medication will be prescribed to last until the next clinic follow up  evaluation; no controlled substance refills (ex. Suboxone/buprenorphine) will be prescribed without completing a clinic evaluation. If you need to reschedule an appointment every effort will be made to reschedule as soon as possible for evaluation and further medication management. Contact the clinic if an extenuating circumstance presents.      Lost/Stolen Medication: Controlled substances (ex. Suboxone/buprenorphine) will not automatically be refilled in the event of lost/stolen medication. If a prescribed controlled substance is stolen, you must notify law enforcement and proof of said notification may be required. Notify the clinic for further guidance.     Involuntary Discharge:  You will be involuntary discharged if you exhibit violent/threatening/abusive behavior or if compelling evidence shows diversion of medication.       TREATMENT PLAN/GOALS: Continue supportive psychotherapy efforts and medications as indicated. Treatment and medication options discussed during today's visit. Patient acknowledged and verbally consented to continue with current treatment plan and was educated on the importance of compliance with treatment and follow-up appointments.    MEDICATION ISSUES:  PAULETTE reviewed  Discussed medication options and treatment plan of prescribed medication as well as the risks, benefits, and side effects including potential falls, possible impaired driving and metabolic adversities among others. Patient is agreeable to call the office with any worsening of symptoms or onset of side effects. Patient is agreeable to call 911 or go to the nearest ER should he/she begin having SI/HI. No medication side effects or related complaints today.     MEDS ORDERED DURING VISIT:  New Medications Ordered This Visit   Medications   • naloxone (NARCAN) 4 MG/0.1ML nasal spray     Si spray into the nostril(s) as directed by provider As Needed (opiate over sedation). 1 spray in 1 nostril every 2 to 3 minutes call 911      Dispense:  2 each     Refill:  2   • buprenorphine-naloxone (SUBOXONE) 8-2 MG per SL tablet     Sig: Place 1 tablet under the tongue Daily.     Dispense:  15 tablet     Refill:  0     NADEAN:[id number]       No follow-ups on file.           This document has been electronically signed by JORI Miranda  August 9, 2022 15:26 EDT      Part of this note may be an electronic transcription/translation of spoken language to printed text using the Dragon Dictation System.

## 2022-08-12 ENCOUNTER — PATIENT ROUNDING (BHMG ONLY) (OUTPATIENT)
Dept: PSYCHIATRY | Facility: CLINIC | Age: 67
End: 2022-08-12

## 2022-08-12 NOTE — PROGRESS NOTES
August 12, 2022    Hello, may I speak with Abbi Franco?    My name is Mckayla     I am  with [facility]  [facility]  [address] [place] [address]  [place] [address] [phone].    Before we get started may I verify your date of birth? 1955    I am calling to officially welcome you to our practice and ask about your recent visit. Is this a good time to talk? yes    Tell me about your visit with us. What things went well?  no       We're always looking for ways to make our patients' experiences even better. Do you have recommendations on ways we may improve? yes    Overall were you satisfied with your first visit to our practice? no       I appreciate you taking the time to speak with me today. Is there anything else I can do for you? yes      Thank you, and have a great day.

## 2022-08-23 ENCOUNTER — TELEMEDICINE (OUTPATIENT)
Dept: PSYCHIATRY | Facility: CLINIC | Age: 67
End: 2022-08-23

## 2022-08-23 VITALS
DIASTOLIC BLOOD PRESSURE: 93 MMHG | SYSTOLIC BLOOD PRESSURE: 189 MMHG | HEIGHT: 64 IN | HEART RATE: 80 BPM | WEIGHT: 140.6 LBS | BODY MASS INDEX: 24.01 KG/M2

## 2022-08-23 DIAGNOSIS — R11.0 NAUSEA: ICD-10-CM

## 2022-08-23 DIAGNOSIS — F41.1 GENERALIZED ANXIETY DISORDER: ICD-10-CM

## 2022-08-23 DIAGNOSIS — F11.20 OPIOID TYPE DEPENDENCE, CONTINUOUS: Primary | ICD-10-CM

## 2022-08-23 DIAGNOSIS — Z79.899 MEDICATION MANAGEMENT: ICD-10-CM

## 2022-08-23 DIAGNOSIS — F51.01 PRIMARY INSOMNIA: ICD-10-CM

## 2022-08-23 LAB
EXTERNAL AMPHETAMINE SCREEN URINE: NEGATIVE
EXTERNAL BENZODIAZEPINE SCREEN URINE: NEGATIVE
EXTERNAL BUPRENORPHINE SCREEN URINE: POSITIVE
EXTERNAL COCAINE SCREEN URINE: NEGATIVE
EXTERNAL MDMA: NEGATIVE
EXTERNAL METHADONE SCREEN URINE: NEGATIVE
EXTERNAL METHAMPHETAMINE SCREEN URINE: NEGATIVE
EXTERNAL OPIATES SCREEN URINE: NEGATIVE
EXTERNAL OXYCODONE SCREEN URINE: POSITIVE
EXTERNAL THC SCREEN URINE: NEGATIVE

## 2022-08-23 PROCEDURE — 99214 OFFICE O/P EST MOD 30 MIN: CPT | Performed by: NURSE PRACTITIONER

## 2022-08-23 RX ORDER — AMITRIPTYLINE HYDROCHLORIDE 25 MG/1
25 TABLET, FILM COATED ORAL NIGHTLY
Qty: 30 TABLET | Refills: 5 | Status: SHIPPED | OUTPATIENT
Start: 2022-08-23

## 2022-08-23 RX ORDER — CLONIDINE HYDROCHLORIDE 0.1 MG/1
0.1 TABLET ORAL 2 TIMES DAILY PRN
Qty: 90 TABLET | Refills: 2 | Status: SHIPPED | OUTPATIENT
Start: 2022-08-23

## 2022-08-23 RX ORDER — BUPRENORPHINE HYDROCHLORIDE AND NALOXONE HYDROCHLORIDE DIHYDRATE 8; 2 MG/1; MG/1
1 TABLET SUBLINGUAL DAILY
Qty: 14 TABLET | Refills: 0 | Status: SHIPPED | OUTPATIENT
Start: 2022-08-23 | End: 2022-09-06 | Stop reason: SDUPTHER

## 2022-08-23 RX ORDER — ONDANSETRON 4 MG/1
8 TABLET, FILM COATED ORAL EVERY 8 HOURS PRN
Qty: 30 TABLET | Refills: 1 | Status: SHIPPED | OUTPATIENT
Start: 2022-08-23 | End: 2022-09-06 | Stop reason: SDUPTHER

## 2022-08-23 NOTE — PROGRESS NOTES
This provider is located at [facility]. The Patient is seen remotely located at the [facility] ([facility]) using Video. Patient is being seen via telehealth and confirm that they are in a secure environment for this session. The patient's condition being diagnosed/treated is appropriate for telemedicine. Provider identified as Champ Escalante as well as credentials APRN NIKOS FNP-HUAN CARO-WILLIAM.   The client/patient gave consent to be seen remotely, and when consent is given they understand that the consent allows for patient identifiable information to be sent to a third party as needed.   They may refuse to be seen remotely at any time. The electronic data is encrypted and password protected, and the patient has been advised of the potential risks to privacy not withstanding such measures.    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  As it relates to Suboxone dosage patient feels this is an adequate dose at this time and we will make no changes at illicit opiate use is significantly decreased; as it relates to anxiety and associated with insomnia will add Elavil with goal to increase serotonergic activity and aid with insomnia; will also add clonidine to be taken as needed for anxiety  -Patient encouraged to establish care with primary care here in the [facility] as patient is exhibiting an elevated blood pressure reading today and currently has no primary care; otherwise patient denies any other  symptoms    Triggers (Persons/Places/Things/Events/Thought/Emotions): Anxiety and insomnia    Cravings: Denies cravings    Relapse Prevention: Counseling    Urine Drug Screen (today's visit) discussed: Positive buprenorphine and positive oxycodone    UDS Confirmation (Most recent/Resulted): Positive buprenorphine/nor-buprenorphine, positive THC nonprescribed gabapentin and hydrocodone (urine associated with initial evaluation 2 weeks ago)    Most recent pertinent laboratory studies reviewed: Patient reminded to report to lab for collection    PAULETTE (PDMP) Reviewed for Current/Active Medications: buprenorphine/naloxone as reviewed today    Past Surgical History:  Past Surgical History:   Procedure Laterality Date   • GALLBLADDER SURGERY     • HYSTERECTOMY         Problem List:  There is no problem list on file for this patient.      Allergy:   Allergies   Allergen Reactions   • Penicillins         Current Medications:   Current Outpatient Medications   Medication Sig Dispense Refill   • naloxone (NARCAN) 4 MG/0.1ML nasal spray Use 1 spray in the nostril as directed by provider As Needed (opiate over sedation). Use 1 spray in 1 nostril every 2 to 3 minutes. Call 911 2 each 2   • promethazine (PHENERGAN) 25 MG tablet Take 1 tablet by mouth Every 6 (Six) Hours As Needed for nausea or vomiting. 12 tablet 0   • amitriptyline (ELAVIL) 25 MG tablet Take 1 tablet by mouth Every Night. 30 tablet 5   • buprenorphine-naloxone (SUBOXONE) 8-2 MG per SL tablet Place 1 tablet under the tongue Daily. 14 tablet 0   • cefdinir (OMNICEF) 300 MG capsule Take 1 capsule by mouth 2 (Two) Times a Day. 20 capsule 0   • cloNIDine (CATAPRES) 0.1 MG tablet Take 1 tablet by mouth 2 (Two) Times a Day As Needed (anxiety/insomnia/chills/sweats). 90 tablet 2   • losartan (COZAAR) 100 MG tablet Take 10 mg by mouth Daily.     • nitrofurantoin, macrocrystal-monohydrate, (MACROBID) 100 MG capsule Take 1 capsule by mouth 2 (Two) Times a Day. 14 capsule  0   • omeprazole (priLOSEC) 20 MG capsule Take 1 capsule by mouth 2 (Two) Times a Day. 30 capsule 0   • ondansetron (Zofran) 4 MG tablet Take 2 tablets by mouth Every 8 (Eight) Hours As Needed for Nausea or Vomiting for up to 30 doses. 30 tablet 1   • promethazine (PHENERGAN) 25 MG tablet Take 1 tablet by mouth Every 6 (Six) Hours As Needed for Vomiting. 30 tablet 0   • rosuvastatin (CRESTOR) 20 MG tablet Take 20 mg by mouth Daily.       No current facility-administered medications for this visit.       Past Medical History:  Past Medical History:   Diagnosis Date   • Arthritis    • Fibromyalgia    • H/O degenerative disc disease          Social History     Socioeconomic History   • Marital status:    Tobacco Use   • Smoking status: Current Some Day Smoker     Packs/day: 0.25     Years: 15.00     Pack years: 3.75     Types: Cigarettes   • Smokeless tobacco: Never Used   • Tobacco comment: pt states that she smokes an e-cig.   Vaping Use   • Vaping Use: Former   • Substances: Nicotine   • Devices: Disposable   Substance and Sexual Activity   • Alcohol use: No   • Drug use: Yes     Types: Hydrocodone, Benzodiazepines     Comment: formerly abused prescribed medications   • Sexual activity: Defer         Family History   Problem Relation Age of Onset   • Alcohol abuse Father    • Mental illness Maternal Grandfather          Mental Status Exam:   Hygiene:   good  Cooperation:  Cooperative  Eye Contact:  Good  Psychomotor Behavior:  Appropriate  Affect:  Appropriate  Mood: normal  Speech:  Normal  Thought Process:  Goal directed  Thought Content:  Normal  Suicidal:  None  Homicidal:  None  Hallucinations:  None  Delusion:  None  Memory:  Intact  Orientation:  Grossly intact  Reliability:  good  Insight:  Good  Judgement:  Good  Impulse Control:  Good         Review of Systems:  Review of Systems   Constitutional: Negative for activity change, chills, diaphoresis and fatigue.   Respiratory: Negative for apnea, cough  "and shortness of breath.    Cardiovascular: Negative for chest pain, palpitations and leg swelling.   Gastrointestinal: Negative for abdominal pain, constipation, diarrhea, nausea and vomiting.   Genitourinary: Negative for difficulty urinating.   Musculoskeletal: Negative for arthralgias.   Skin: Negative for rash.   Neurological: Negative for dizziness, weakness and headaches.   Psychiatric/Behavioral: Positive for sleep disturbance. Negative for agitation, self-injury and suicidal ideas. The patient is nervous/anxious.          Physical Exam:  Physical Exam  Vitals reviewed.   Constitutional:       General: She is not in acute distress.     Appearance: Normal appearance. She is not ill-appearing or toxic-appearing.   Pulmonary:      Effort: Pulmonary effort is normal.   Musculoskeletal:         General: Normal range of motion.   Neurological:      General: No focal deficit present.      Mental Status: She is alert and oriented to person, place, and time.   Psychiatric:         Attention and Perception: Attention and perception normal.         Mood and Affect: Mood normal. Mood is not anxious or depressed.         Speech: Speech normal.         Behavior: Behavior normal. Behavior is cooperative.         Thought Content: Thought content normal.         Cognition and Memory: Cognition and memory normal.         Judgment: Judgment normal.         Vital Signs:   BP (!) 189/93   Pulse 80   Ht 162.6 cm (64\")   Wt 63.8 kg (140 lb 9.6 oz)   BMI 24.13 kg/m²      Lab Results:   Telemedicine on 08/23/2022   Component Date Value Ref Range Status   • External Amphetamine Screen Urine 08/23/2022 Negative   Final   • External Benzodiazepine Screen Uri* 08/23/2022 Negative   Final   • External Cocaine Screen Urine 08/23/2022 Negative   Final   • External THC Screen Urine 08/23/2022 Negative   Final   • External Methadone Screen Urine 08/23/2022 Negative   Final   • External Methamphetamine Screen Ur* 08/23/2022 Negative   " Final   • External Oxycodone Screen Urine 08/23/2022 Positive (A)  Final   • External Buprenorphine Screen Urine 08/23/2022 Positive (A)  Final   • External MDMA 08/23/2022 Negative   Final   • External Opiates Screen Urine 08/23/2022 Negative   Final   Office Visit on 08/09/2022   Component Date Value Ref Range Status   • External Amphetamine Screen Urine 08/09/2022 Negative   Final-Edited   • External Benzodiazepine Screen Uri* 08/09/2022 Negative   Final-Edited   • External Cocaine Screen Urine 08/09/2022 Negative   Final-Edited   • External THC Screen Urine 08/09/2022 Positive (A)  Corrected   • External Methadone Screen Urine 08/09/2022 Negative   Final-Edited   • External Methamphetamine Screen Ur* 08/09/2022 Negative   Final-Edited   • External Oxycodone Screen Urine 08/09/2022 Negative   Final-Edited   • External Buprenorphine Screen Urine 08/09/2022 Negative   Corrected   • External MDMA 08/09/2022 Negative   Final-Edited   • External Opiates Screen Urine 08/09/2022 Negative   Final-Edited         Assessment & Plan   Diagnoses and all orders for this visit:    1. Opioid type dependence, continuous (HCC) (Primary)  -     buprenorphine-naloxone (SUBOXONE) 8-2 MG per SL tablet; Place 1 tablet under the tongue Daily.  Dispense: 14 tablet; Refill: 0    2. Medication management  -     [facility] Drug Screen    3. Primary insomnia  -     amitriptyline (ELAVIL) 25 MG tablet; Take 1 tablet by mouth Every Night.  Dispense: 30 tablet; Refill: 5    4. Generalized anxiety disorder  -     amitriptyline (ELAVIL) 25 MG tablet; Take 1 tablet by mouth Every Night.  Dispense: 30 tablet; Refill: 5  -     cloNIDine (CATAPRES) 0.1 MG tablet; Take 1 tablet by mouth 2 (Two) Times a Day As Needed (anxiety/insomnia/chills/sweats).  Dispense: 90 tablet; Refill: 2    5. Nausea  -     ondansetron (Zofran) 4 MG tablet; Take 2 tablets by mouth Every 8 (Eight) Hours As Needed for Nausea or Vomiting for up to 30 doses.  Dispense: 30 tablet;  Refill: 1        Visit Diagnoses:    ICD-10-CM ICD-9-CM   1. Opioid type dependence, continuous (HCC)  F11.20 304.01   2. Medication management  Z79.899 V58.69   3. Primary insomnia  F51.01 307.42   4. Generalized anxiety disorder  F41.1 300.02   5. Nausea  R11.0 787.02       PLAN:  1. Safety: No acute safety concerns  2. Risk Assessment: Risk of self-harm acutely is low. Risk of self-harm chronically is also low, but could be further elevated in the event of treatment noncompliance and/or AODA.    TREATMENT PLAN/GOALS: Continue supportive psychotherapy efforts and medications as indicated. Treatment and medication options discussed during today's visit. Patient acknowledged and verbally consented to continue with current treatment plan and was educated on the importance of compliance with treatment and follow-up appointments.    MEDICATION ISSUES:  PAULETTE reviewed as expected.  Discussed medication options and treatment plan of prescribed medication as well as the risks, benefits, and side effects including potential falls, possible impaired driving and metabolic adversities among others. Patient is agreeable to call the office with any worsening of symptoms or onset of side effects. Patient is agreeable to call 911 or go to the nearest ER should he/she begin having SI/HI. No medication side effects or related complaints today.     MEDS ORDERED DURING VISIT:  New Medications Ordered This Visit   Medications   • buprenorphine-naloxone (SUBOXONE) 8-2 MG per SL tablet     Sig: Place 1 tablet under the tongue Daily.     Dispense:  14 tablet     Refill:  0     NADEAN:[id number]   • amitriptyline (ELAVIL) 25 MG tablet     Sig: Take 1 tablet by mouth Every Night.     Dispense:  30 tablet     Refill:  5   • cloNIDine (CATAPRES) 0.1 MG tablet     Sig: Take 1 tablet by mouth 2 (Two) Times a Day As Needed (anxiety/insomnia/chills/sweats).     Dispense:  90 tablet     Refill:  2   • ondansetron (Zofran) 4 MG tablet     Sig:  Take 2 tablets by mouth Every 8 (Eight) Hours As Needed for Nausea or Vomiting for up to 30 doses.     Dispense:  30 tablet     Refill:  1       No follow-ups on file.           This document has been electronically signed by JORI Miranda  August 23, 2022 09:58 EDT      Part of this note may be an electronic transcription/translation of spoken language to printed text using the Dragon Dictation System.

## 2022-09-01 ENCOUNTER — TELEPHONE (OUTPATIENT)
Dept: PSYCHIATRY | Facility: CLINIC | Age: 67
End: 2022-09-01

## 2022-09-01 DIAGNOSIS — K59.03 DRUG-INDUCED CONSTIPATION: Primary | ICD-10-CM

## 2022-09-01 RX ORDER — BISACODYL 5 MG
5 TABLET, DELAYED RELEASE (ENTERIC COATED) ORAL DAILY PRN
Qty: 30 TABLET | Refills: 1 | Status: SHIPPED | OUTPATIENT
Start: 2022-09-01 | End: 2023-09-01

## 2022-09-01 NOTE — TELEPHONE ENCOUNTER
Patient is asking for something to help her bowels move. She said she has tried OTC stool softeners and they have not helped. Thanks.

## 2022-09-06 ENCOUNTER — TELEMEDICINE (OUTPATIENT)
Dept: PSYCHIATRY | Facility: CLINIC | Age: 67
End: 2022-09-06

## 2022-09-06 VITALS
DIASTOLIC BLOOD PRESSURE: 63 MMHG | BODY MASS INDEX: 24.24 KG/M2 | SYSTOLIC BLOOD PRESSURE: 106 MMHG | HEART RATE: 85 BPM | WEIGHT: 142 LBS | HEIGHT: 64 IN

## 2022-09-06 DIAGNOSIS — Z79.899 MEDICATION MANAGEMENT: ICD-10-CM

## 2022-09-06 DIAGNOSIS — F11.20 OPIOID TYPE DEPENDENCE, CONTINUOUS: Primary | ICD-10-CM

## 2022-09-06 DIAGNOSIS — R11.0 NAUSEA: ICD-10-CM

## 2022-09-06 LAB
EXTERNAL AMPHETAMINE SCREEN URINE: NEGATIVE
EXTERNAL BENZODIAZEPINE SCREEN URINE: NEGATIVE
EXTERNAL BUPRENORPHINE SCREEN URINE: POSITIVE
EXTERNAL COCAINE SCREEN URINE: NEGATIVE
EXTERNAL MDMA: NEGATIVE
EXTERNAL METHADONE SCREEN URINE: NEGATIVE
EXTERNAL METHAMPHETAMINE SCREEN URINE: NEGATIVE
EXTERNAL OPIATES SCREEN URINE: NEGATIVE
EXTERNAL OXYCODONE SCREEN URINE: NEGATIVE
EXTERNAL THC SCREEN URINE: POSITIVE

## 2022-09-06 PROCEDURE — 99213 OFFICE O/P EST LOW 20 MIN: CPT | Performed by: NURSE PRACTITIONER

## 2022-09-06 RX ORDER — BUPRENORPHINE HYDROCHLORIDE AND NALOXONE HYDROCHLORIDE DIHYDRATE 8; 2 MG/1; MG/1
1.5 TABLET SUBLINGUAL DAILY
Qty: 11 TABLET | Refills: 0 | Status: SHIPPED | OUTPATIENT
Start: 2022-09-06 | End: 2022-09-13 | Stop reason: SDUPTHER

## 2022-09-06 RX ORDER — ONDANSETRON 4 MG/1
8 TABLET, FILM COATED ORAL EVERY 8 HOURS PRN
Qty: 30 TABLET | Refills: 1 | Status: SHIPPED | OUTPATIENT
Start: 2022-09-06 | End: 2022-09-13 | Stop reason: SDUPTHER

## 2022-09-06 NOTE — PROGRESS NOTES
This provider is located at [facility]. The Patient is seen remotely located at the [facility] ([facility]) using Video. Patient is being seen via telehealth and confirm that they are in a secure environment for this session. The patient's condition being diagnosed/treated is appropriate for telemedicine. Provider identified as Champ Escalante as well as credentials APRN MSN FNP-C VANIA-WILLIAM.   The client/patient gave consent to be seen remotely, and when consent is given they understand that the consent allows for patient identifiable information to be sent to a third party as needed.   They may refuse to be seen remotely at any time. The electronic data is encrypted and password protected, and the patient has been advised of the potential risks to privacy not withstanding such measures.    Chief Complaint/History of Present Illness: Follow Up buprenorphine/naloxone Medicated Assisted Treatment for Opiate Use Disorder     Patient/Client Concerns/Updates: Continuing Elavil for anxiety and insomnia patient reports she is doing well on this medication and feels it is helpful; no concerning side effects  -Patient continues to struggle with illicit Percocet, further history reveals main trigger is the habit of use and friends who provide that substance; as it relates to medication therapy will increase Suboxone to 12-3 mg daily recommending split dosing to coincide with cravings; patient reminded of the risk of concurrent CNS depressants buprenorphine and Percocet which creates a potentially life-threatening risk of CNS oversedation and overdose and all efforts must be to eliminate illicit opiates    Triggers (Persons/Places/Things/Events/Thought/Emotions): Other individuals    Cravings: Continued intermittent opiate cravings    Relapse Prevention: Patient agrees to consider counseling    Urine Drug Screen (today's visit) discussed: Positive buprenorphine and positive THC    UDS Confirmation (Most  recent/Resulted): Most recent urine confirmation results remain pending    Most recent pertinent laboratory studies reviewed: Patient reminded to report to lab for collection    PAULETTE (PDMP) Reviewed for Current/Active Medications: buprenorphine/naloxone as reviewed today    Past Surgical History:  Past Surgical History:   Procedure Laterality Date   • GALLBLADDER SURGERY     • HYSTERECTOMY         Problem List:  There is no problem list on file for this patient.      Allergy:   Allergies   Allergen Reactions   • Penicillins         Current Medications:   Current Outpatient Medications   Medication Sig Dispense Refill   • amitriptyline (ELAVIL) 25 MG tablet Take 1 tablet by mouth Every Night. 30 tablet 5   • bisacodyl (bisacodyl) 5 MG EC tablet Take 1 tablet by mouth Daily As Needed for Constipation. 30 tablet 1   • buprenorphine-naloxone (SUBOXONE) 8-2 MG per SL tablet Place 1.5 tablets under the tongue Daily. 11 tablet 0   • cloNIDine (CATAPRES) 0.1 MG tablet Take 1 tablet by mouth 2 (Two) Times a Day As Needed (anxiety/insomnia/chills/sweats). 90 tablet 2   • ondansetron (Zofran) 4 MG tablet Take 2 tablets by mouth Every 8 (Eight) Hours As Needed for Nausea or Vomiting for up to 30 doses. 30 tablet 1   • cefdinir (OMNICEF) 300 MG capsule Take 1 capsule by mouth 2 (Two) Times a Day. 20 capsule 0   • losartan (COZAAR) 100 MG tablet Take 10 mg by mouth Daily.     • naloxone (NARCAN) 4 MG/0.1ML nasal spray Use 1 spray in the nostril as directed by provider As Needed (opiate over sedation). Use 1 spray in 1 nostril every 2 to 3 minutes. Call 911 2 each 2   • nitrofurantoin, macrocrystal-monohydrate, (MACROBID) 100 MG capsule Take 1 capsule by mouth 2 (Two) Times a Day. 14 capsule 0   • omeprazole (priLOSEC) 20 MG capsule Take 1 capsule by mouth 2 (Two) Times a Day. 30 capsule 0   • promethazine (PHENERGAN) 25 MG tablet Take 1 tablet by mouth Every 6 (Six) Hours As Needed for nausea or vomiting. 12 tablet 0   •  promethazine (PHENERGAN) 25 MG tablet Take 1 tablet by mouth Every 6 (Six) Hours As Needed for Vomiting. 30 tablet 0   • rosuvastatin (CRESTOR) 20 MG tablet Take 20 mg by mouth Daily.       No current facility-administered medications for this visit.       Past Medical History:  Past Medical History:   Diagnosis Date   • Arthritis    • Fibromyalgia    • H/O degenerative disc disease          Social History     Socioeconomic History   • Marital status:    Tobacco Use   • Smoking status: Current Some Day Smoker     Packs/day: 0.25     Years: 15.00     Pack years: 3.75     Types: Cigarettes   • Smokeless tobacco: Never Used   • Tobacco comment: pt states that she smokes an e-cig.   Vaping Use   • Vaping Use: Former   • Substances: Nicotine   • Devices: Disposable   Substance and Sexual Activity   • Alcohol use: No   • Drug use: Yes     Types: Hydrocodone, Benzodiazepines     Comment: formerly abused prescribed medications   • Sexual activity: Defer         Family History   Problem Relation Age of Onset   • Alcohol abuse Father    • Mental illness Maternal Grandfather          Mental Status Exam:   Hygiene:   good  Cooperation:  Cooperative  Eye Contact:  Good  Psychomotor Behavior:  Appropriate  Affect:  Appropriate  Mood: anxious  Speech:  Normal  Thought Process:  Goal directed  Thought Content:  Normal  Suicidal:  None  Homicidal:  None  Hallucinations:  None  Delusion:  None  Memory:  Intact  Orientation:  Grossly intact  Reliability:  good  Insight:  Fair  Judgement:  Fair  Impulse Control:  Fair         Review of Systems:  Review of Systems   Constitutional: Negative for activity change, chills, diaphoresis and fatigue.   Respiratory: Negative for apnea, cough and shortness of breath.    Cardiovascular: Negative for chest pain, palpitations and leg swelling.   Gastrointestinal: Positive for nausea. Negative for abdominal pain, constipation, diarrhea and vomiting.   Genitourinary: Negative for difficulty  "urinating.   Musculoskeletal: Negative for arthralgias.   Skin: Negative for rash.   Neurological: Negative for dizziness, weakness and headaches.   Psychiatric/Behavioral: Positive for sleep disturbance. Negative for agitation, self-injury and suicidal ideas. The patient is nervous/anxious.          Physical Exam:  Physical Exam  Vitals reviewed.   Constitutional:       General: She is not in acute distress.     Appearance: Normal appearance. She is not ill-appearing or toxic-appearing.   Pulmonary:      Effort: Pulmonary effort is normal.   Musculoskeletal:         General: Normal range of motion.   Neurological:      General: No focal deficit present.      Mental Status: She is alert and oriented to person, place, and time.   Psychiatric:         Attention and Perception: Attention and perception normal.         Mood and Affect: Mood is anxious. Mood is not depressed.         Speech: Speech normal.         Behavior: Behavior normal. Behavior is cooperative.         Thought Content: Thought content normal.         Cognition and Memory: Cognition and memory normal.         Judgment: Judgment normal.         Vital Signs:   /63   Pulse 85   Ht 162.6 cm (64.02\")   Wt 64.4 kg (142 lb)   BMI 24.36 kg/m²      Lab Results:   Telemedicine on 09/06/2022   Component Date Value Ref Range Status   • External Amphetamine Screen Urine 09/06/2022 Negative   Final   • External Benzodiazepine Screen Uri* 09/06/2022 Negative   Final   • External Cocaine Screen Urine 09/06/2022 Negative   Final   • External THC Screen Urine 09/06/2022 Positive (A)  Final   • External Methadone Screen Urine 09/06/2022 Negative   Final   • External Methamphetamine Screen Ur* 09/06/2022 Negative   Final   • External Oxycodone Screen Urine 09/06/2022 Negative   Final   • External Buprenorphine Screen Urine 09/06/2022 Positive (A)  Final   • External MDMA 09/06/2022 Negative   Final   • External Opiates Screen Urine 09/06/2022 Negative   Final "   Telemedicine on 08/23/2022   Component Date Value Ref Range Status   • External Amphetamine Screen Urine 08/23/2022 Negative   Final   • External Benzodiazepine Screen Uri* 08/23/2022 Negative   Final   • External Cocaine Screen Urine 08/23/2022 Negative   Final   • External THC Screen Urine 08/23/2022 Negative   Final   • External Methadone Screen Urine 08/23/2022 Negative   Final   • External Methamphetamine Screen Ur* 08/23/2022 Negative   Final   • External Oxycodone Screen Urine 08/23/2022 Positive (A)  Final   • External Buprenorphine Screen Urine 08/23/2022 Positive (A)  Final   • External MDMA 08/23/2022 Negative   Final   • External Opiates Screen Urine 08/23/2022 Negative   Final   Office Visit on 08/09/2022   Component Date Value Ref Range Status   • External Amphetamine Screen Urine 08/09/2022 Negative   Final-Edited   • External Benzodiazepine Screen Uri* 08/09/2022 Negative   Final-Edited   • External Cocaine Screen Urine 08/09/2022 Negative   Final-Edited   • External THC Screen Urine 08/09/2022 Positive (A)  Corrected   • External Methadone Screen Urine 08/09/2022 Negative   Final-Edited   • External Methamphetamine Screen Ur* 08/09/2022 Negative   Final-Edited   • External Oxycodone Screen Urine 08/09/2022 Negative   Final-Edited   • External Buprenorphine Screen Urine 08/09/2022 Negative   Corrected   • External MDMA 08/09/2022 Negative   Final-Edited   • External Opiates Screen Urine 08/09/2022 Negative   Final-Edited         Assessment & Plan   Diagnoses and all orders for this visit:    1. Opioid type dependence, continuous (HCC) (Primary)  -     buprenorphine-naloxone (SUBOXONE) 8-2 MG per SL tablet; Place 1.5 tablets under the tongue Daily.  Dispense: 11 tablet; Refill: 0    2. Medication management  -     [facility] Drug Screen    3. Nausea  -     ondansetron (Zofran) 4 MG tablet; Take 2 tablets by mouth Every 8 (Eight) Hours As Needed for Nausea or Vomiting for up to 30 doses.  Dispense: 30  tablet; Refill: 1        Visit Diagnoses:    ICD-10-CM ICD-9-CM   1. Opioid type dependence, continuous (HCC)  F11.20 304.01   2. Medication management  Z79.899 V58.69   3. Nausea  R11.0 787.02       PLAN:  1. Safety: No acute safety concerns  2. Risk Assessment: Risk of self-harm acutely is low. Risk of self-harm chronically is also low, but could be further elevated in the event of treatment noncompliance and/or AODA.    TREATMENT PLAN/GOALS: Continue supportive psychotherapy efforts and medications as indicated. Treatment and medication options discussed during today's visit. Patient acknowledged and verbally consented to continue with current treatment plan and was educated on the importance of compliance with treatment and follow-up appointments.    MEDICATION ISSUES:  PAULETTE reviewed as expected.  Discussed medication options and treatment plan of prescribed medication as well as the risks, benefits, and side effects including potential falls, possible impaired driving and metabolic adversities among others. Patient is agreeable to call the office with any worsening of symptoms or onset of side effects. Patient is agreeable to call 911 or go to the nearest ER should he/she begin having SI/HI. No medication side effects or related complaints today.     MEDS ORDERED DURING VISIT:  New Medications Ordered This Visit   Medications   • buprenorphine-naloxone (SUBOXONE) 8-2 MG per SL tablet     Sig: Place 1.5 tablets under the tongue Daily.     Dispense:  11 tablet     Refill:  0     NADEAN:[id number]   • ondansetron (Zofran) 4 MG tablet     Sig: Take 2 tablets by mouth Every 8 (Eight) Hours As Needed for Nausea or Vomiting for up to 30 doses.     Dispense:  30 tablet     Refill:  1       No follow-ups on file.           This document has been electronically signed by JORI Miranda  September 6, 2022 11:31 EDT      Part of this note may be an electronic transcription/translation of spoken language to printed  text using the Dragon Dictation System.

## 2022-09-13 ENCOUNTER — TELEMEDICINE (OUTPATIENT)
Dept: PSYCHIATRY | Facility: CLINIC | Age: 67
End: 2022-09-13

## 2022-09-13 ENCOUNTER — TELEPHONE (OUTPATIENT)
Dept: PSYCHIATRY | Facility: CLINIC | Age: 67
End: 2022-09-13

## 2022-09-13 VITALS
WEIGHT: 143.8 LBS | BODY MASS INDEX: 24.55 KG/M2 | DIASTOLIC BLOOD PRESSURE: 74 MMHG | HEIGHT: 64 IN | SYSTOLIC BLOOD PRESSURE: 118 MMHG | HEART RATE: 74 BPM

## 2022-09-13 DIAGNOSIS — K59.03 DRUG-INDUCED CONSTIPATION: Primary | ICD-10-CM

## 2022-09-13 DIAGNOSIS — F11.20 OPIOID TYPE DEPENDENCE, CONTINUOUS: Primary | ICD-10-CM

## 2022-09-13 DIAGNOSIS — Z79.899 MEDICATION MANAGEMENT: ICD-10-CM

## 2022-09-13 DIAGNOSIS — R11.0 NAUSEA: ICD-10-CM

## 2022-09-13 PROCEDURE — 99213 OFFICE O/P EST LOW 20 MIN: CPT | Performed by: NURSE PRACTITIONER

## 2022-09-13 RX ORDER — BUPRENORPHINE HYDROCHLORIDE AND NALOXONE HYDROCHLORIDE DIHYDRATE 8; 2 MG/1; MG/1
1.5 TABLET SUBLINGUAL DAILY
Qty: 21 TABLET | Refills: 0 | Status: SHIPPED | OUTPATIENT
Start: 2022-09-13 | End: 2022-09-26 | Stop reason: SDUPTHER

## 2022-09-13 RX ORDER — ONDANSETRON 4 MG/1
8 TABLET, FILM COATED ORAL EVERY 8 HOURS PRN
Qty: 30 TABLET | Refills: 1 | Status: SHIPPED | OUTPATIENT
Start: 2022-09-13 | End: 2022-09-26 | Stop reason: SDUPTHER

## 2022-09-13 RX ORDER — BUPRENORPHINE HYDROCHLORIDE AND NALOXONE HYDROCHLORIDE DIHYDRATE 8; 2 MG/1; MG/1
1.5 TABLET SUBLINGUAL DAILY
Qty: 11 TABLET | Refills: 0 | Status: SHIPPED | OUTPATIENT
Start: 2022-09-13 | End: 2022-09-13 | Stop reason: SDUPTHER

## 2022-09-13 RX ORDER — POLYETHYLENE GLYCOL 3350 17 G/17G
17 POWDER, FOR SOLUTION ORAL DAILY PRN
Qty: 30 PACKET | Refills: 2 | Status: SHIPPED | OUTPATIENT
Start: 2022-09-13

## 2022-09-13 RX ORDER — DOCUSATE SODIUM 100 MG/1
100 CAPSULE, LIQUID FILLED ORAL 2 TIMES DAILY PRN
Qty: 60 CAPSULE | Refills: 1 | Status: SHIPPED | OUTPATIENT
Start: 2022-09-13

## 2022-09-13 NOTE — PROGRESS NOTES
Chief Complaint/History of Present Illness: Follow Up buprenorphine/naloxone Medicated Assisted Treatment for Opiate Use Disorder     Patient/Client Concerns/Updates: Continuing Elavil for anxiety and insomnia, and buprenorphine/naloxone increased last evaluation due to persistent cravings for opiates patient reports buprenorphine dose increase has been beneficial and patient has decreased opiate pain tablet use from 2 times to 1 time this past week and overall feels opiate cravings are decreasing and feels she is improving, discussed self care and self love    Triggers (Persons/Places/Things/Events/Thought/Emotions): Life stress    Cravings: Denies cravings    Relapse Prevention: Counseling    Urine Drug Screen (today's visit) discussed: Positive buprenorphine positive THC    UDS Confirmation (Most recent/Resulted): Positive buprenorphine/nor-buprenorphine, no concerns for urine tampering otherwise positive for THC metabolite for amitriptyline, metabolite for hydrocodone and oxycodone    Most recent pertinent laboratory studies reviewed: She has yet to report to lab for collection    PAULETTE (PDMP) Reviewed for Current/Active Medications: buprenorphine/naloxone as reviewed today    Past Surgical History:  Past Surgical History:   Procedure Laterality Date   • GALLBLADDER SURGERY     • HYSTERECTOMY         Problem List:  There is no problem list on file for this patient.      Allergy:   Allergies   Allergen Reactions   • Penicillins         Current Medications:   Current Outpatient Medications   Medication Sig Dispense Refill   • amitriptyline (ELAVIL) 25 MG tablet Take 1 tablet by mouth Every Night. 30 tablet 5   • bisacodyl (bisacodyl) 5 MG EC tablet Take 1 tablet by mouth Daily As Needed for Constipation. 30 tablet 1   • buprenorphine-naloxone (SUBOXONE) 8-2 MG per SL tablet Place 1 & 1/2 tablets under the tongue Daily. 21 tablet 0   • cloNIDine (CATAPRES) 0.1 MG tablet Take 1 tablet by mouth 2 (Two) Times a Day  As Needed (anxiety/insomnia/chills/sweats). 90 tablet 2   • losartan (COZAAR) 100 MG tablet Take 10 mg by mouth Daily.     • ondansetron (Zofran) 4 MG tablet Take 2 tablets by mouth Every 8 (Eight) Hours As Needed for Nausea or Vomiting for up to 30 doses. 30 tablet 1   • promethazine (PHENERGAN) 25 MG tablet Take 1 tablet by mouth Every 6 (Six) Hours As Needed for Vomiting. 30 tablet 0   • cefdinir (OMNICEF) 300 MG capsule Take 1 capsule by mouth 2 (Two) Times a Day. 20 capsule 0   • naloxone (NARCAN) 4 MG/0.1ML nasal spray Use 1 spray in the nostril as directed by provider As Needed (opiate over sedation). Use 1 spray in 1 nostril every 2 to 3 minutes. Call 911 2 each 2   • nitrofurantoin, macrocrystal-monohydrate, (MACROBID) 100 MG capsule Take 1 capsule by mouth 2 (Two) Times a Day. 14 capsule 0   • omeprazole (priLOSEC) 20 MG capsule Take 1 capsule by mouth 2 (Two) Times a Day. 30 capsule 0   • promethazine (PHENERGAN) 25 MG tablet Take 1 tablet by mouth Every 6 (Six) Hours As Needed for nausea or vomiting. 12 tablet 0   • rosuvastatin (CRESTOR) 20 MG tablet Take 20 mg by mouth Daily.       No current facility-administered medications for this visit.       Past Medical History:  Past Medical History:   Diagnosis Date   • Arthritis    • Fibromyalgia    • H/O degenerative disc disease              Social History     Socioeconomic History   • Marital status:    Tobacco Use   • Smoking status: Current Some Day Smoker     Packs/day: 0.25     Years: 15.00     Pack years: 3.75     Types: Cigarettes   • Smokeless tobacco: Never Used   • Tobacco comment: pt states that she smokes an e-cig.   Vaping Use   • Vaping Use: Former   • Substances: Nicotine   • Devices: Disposable   Substance and Sexual Activity   • Alcohol use: No   • Drug use: Yes     Types: Hydrocodone, Benzodiazepines     Comment: formerly abused prescribed medications   • Sexual activity: Defer       Family History   Problem Relation Age of Onset    • Alcohol abuse Father    • Mental illness Maternal Grandfather          Mental Status Exam:   Hygiene:   good  Cooperation:  Cooperative  Eye Contact:  Good  Psychomotor Behavior:  Appropriate  Affect:  Appropriate  Mood: normal  Speech:  Normal  Thought Process:  Goal directed  Thought Content:  Normal  Suicidal:  None  Homicidal:  None  Hallucinations:  None  Delusion:  None  Memory:  Intact  Orientation:  Grossly intact  Reliability:  good  Insight:  Good  Judgement:  Good  Impulse Control:  Good            Review of Systems:  Review of Systems   Constitutional: Negative for activity change, chills, diaphoresis and fatigue.   Respiratory: Negative for apnea, cough and shortness of breath.    Cardiovascular: Negative for chest pain, palpitations and leg swelling.   Gastrointestinal: Positive for nausea. Negative for abdominal pain, constipation, diarrhea and vomiting.   Genitourinary: Negative for difficulty urinating.   Musculoskeletal: Negative for arthralgias.   Skin: Negative for rash.   Neurological: Negative for dizziness, weakness and headaches.   Psychiatric/Behavioral: Negative for agitation, self-injury, sleep disturbance and suicidal ideas. The patient is not nervous/anxious.          Physical Exam:  Physical Exam  Vitals reviewed.   Constitutional:       General: She is not in acute distress.     Appearance: Normal appearance. She is not ill-appearing or toxic-appearing.   Pulmonary:      Effort: Pulmonary effort is normal.   Musculoskeletal:         General: Normal range of motion.   Neurological:      General: No focal deficit present.      Mental Status: She is alert and oriented to person, place, and time.   Psychiatric:         Attention and Perception: Attention and perception normal.         Mood and Affect: Mood normal. Mood is not anxious or depressed.         Speech: Speech normal.         Behavior: Behavior normal. Behavior is cooperative.         Thought Content: Thought content normal.   "       Cognition and Memory: Cognition and memory normal.         Judgment: Judgment normal.         Vital Signs:   /74   Pulse 74   Ht 162.6 cm (64.02\")   Wt 65.2 kg (143 lb 12.8 oz)   BMI 24.67 kg/m²      Lab Results:   Telemedicine on 09/13/2022   Component Date Value Ref Range Status   • External Amphetamine Screen Urine 09/13/2022 Negative   Final   • External Benzodiazepine Screen Uri* 09/13/2022 Negative   Final   • External Cocaine Screen Urine 09/13/2022 Negative   Final   • External THC Screen Urine 09/13/2022 Positive (A)  Final   • External Methadone Screen Urine 09/13/2022 Negative   Final   • External Methamphetamine Screen Ur* 09/13/2022 Negative   Final   • External Oxycodone Screen Urine 09/13/2022 Negative   Final   • External Buprenorphine Screen Urine 09/13/2022 Positive (A)  Final   • External MDMA 09/13/2022 Negative   Final   • External Opiates Screen Urine 09/13/2022 Negative   Final   Telemedicine on 09/06/2022   Component Date Value Ref Range Status   • External Amphetamine Screen Urine 09/06/2022 Negative   Final   • External Benzodiazepine Screen Uri* 09/06/2022 Negative   Final   • External Cocaine Screen Urine 09/06/2022 Negative   Final   • External THC Screen Urine 09/06/2022 Positive (A)  Final   • External Methadone Screen Urine 09/06/2022 Negative   Final   • External Methamphetamine Screen Ur* 09/06/2022 Negative   Final   • External Oxycodone Screen Urine 09/06/2022 Negative   Final   • External Buprenorphine Screen Urine 09/06/2022 Positive (A)  Final   • External MDMA 09/06/2022 Negative   Final   • External Opiates Screen Urine 09/06/2022 Negative   Final   Telemedicine on 08/23/2022   Component Date Value Ref Range Status   • External Amphetamine Screen Urine 08/23/2022 Negative   Final   • External Benzodiazepine Screen Uri* 08/23/2022 Negative   Final   • External Cocaine Screen Urine 08/23/2022 Negative   Final   • External THC Screen Urine 08/23/2022 Negative   " Final   • External Methadone Screen Urine 08/23/2022 Negative   Final   • External Methamphetamine Screen Ur* 08/23/2022 Negative   Final   • External Oxycodone Screen Urine 08/23/2022 Positive (A)  Final   • External Buprenorphine Screen Urine 08/23/2022 Positive (A)  Final   • External MDMA 08/23/2022 Negative   Final   • External Opiates Screen Urine 08/23/2022 Negative   Final   Office Visit on 08/09/2022   Component Date Value Ref Range Status   • External Amphetamine Screen Urine 08/09/2022 Negative   Final-Edited   • External Benzodiazepine Screen Uri* 08/09/2022 Negative   Final-Edited   • External Cocaine Screen Urine 08/09/2022 Negative   Final-Edited   • External THC Screen Urine 08/09/2022 Positive (A)  Corrected   • External Methadone Screen Urine 08/09/2022 Negative   Final-Edited   • External Methamphetamine Screen Ur* 08/09/2022 Negative   Final-Edited   • External Oxycodone Screen Urine 08/09/2022 Negative   Final-Edited   • External Buprenorphine Screen Urine 08/09/2022 Negative   Corrected   • External MDMA 08/09/2022 Negative   Final-Edited   • External Opiates Screen Urine 08/09/2022 Negative   Final-Edited           Assessment & Plan   Diagnoses and all orders for this visit:    1. Opioid type dependence, continuous (HCC) (Primary)  -     Discontinue: buprenorphine-naloxone (SUBOXONE) 8-2 MG per SL tablet; Place 1 & 1/2 tablets under the tongue Daily.  Dispense: 11 tablet; Refill: 0  -     buprenorphine-naloxone (SUBOXONE) 8-2 MG per SL tablet; Place 1 & 1/2 tablets under the tongue Daily.  Dispense: 21 tablet; Refill: 0    2. Medication management  -     [facility] Drug Screen    3. Nausea  -     ondansetron (Zofran) 4 MG tablet; Take 2 tablets by mouth Every 8 (Eight) Hours As Needed for Nausea or Vomiting for up to 30 doses.  Dispense: 30 tablet; Refill: 1        Visit Diagnoses:    ICD-10-CM ICD-9-CM   1. Opioid type dependence, continuous (HCC)  F11.20 304.01   2. Medication management   Z79.899 V58.69   3. Nausea  R11.0 787.02       PLAN:  1. Safety: No acute safety concerns  2. Risk Assessment: Risk of self-harm acutely is low. Risk of self-harm chronically is also low, but could be further elevated in the event of treatment noncompliance and/or AODA.      TREATMENT PLAN/GOALS: Continue supportive psychotherapy efforts and medications as indicated. Treatment and medication options discussed during today's visit. Patient acknowledged and verbally consented to continue with current treatment plan and was educated on the importance of compliance with treatment and follow-up appointments.        MEDICATION ISSUES:  PAULETTE reviewed as expected.  Discussed medication options and treatment plan of prescribed medication as well as the risks, benefits, and side effects including potential falls, possible impaired driving and metabolic adversities among others. Patient is agreeable to call the office with any worsening of symptoms or onset of side effects. Patient is agreeable to call 911 or go to the nearest ER should he/she begin having SI/HI. No medication side effects or related complaints today.     MEDS ORDERED DURING VISIT:  New Medications Ordered This Visit   Medications   • ondansetron (Zofran) 4 MG tablet     Sig: Take 2 tablets by mouth Every 8 (Eight) Hours As Needed for Nausea or Vomiting for up to 30 doses.     Dispense:  30 tablet     Refill:  1   • buprenorphine-naloxone (SUBOXONE) 8-2 MG per SL tablet     Sig: Place 1 & 1/2 tablets under the tongue Daily.     Dispense:  21 tablet     Refill:  0     NADEAN:[id number]       No follow-ups on file.           This document has been electronically signed by JORI Miranda  September 13, 2022 09:03 EDT      Part of this note may be an electronic transcription/translation of spoken language to printed text using the Dragon Dictation System.

## 2022-09-13 NOTE — TELEPHONE ENCOUNTER
Abbi called and said she forgot to tell you this morning that she is still having trouble going to the bathroom- if you could call her in some medicine to [facility] if possible?

## 2022-09-26 ENCOUNTER — TELEPHONE (OUTPATIENT)
Dept: PSYCHIATRY | Facility: CLINIC | Age: 67
End: 2022-09-26

## 2022-09-26 NOTE — TELEPHONE ENCOUNTER
Patient is out in the parking lot sick to her stomach and doesn't think she can make it in the office for her appointment today without getting sick. Brent is sending her inv 7 days  worth of medication and some medicine for upset stomach and wants patient to be rescheduled to see him in 7 days.

## 2022-10-03 ENCOUNTER — OFFICE VISIT (OUTPATIENT)
Dept: FAMILY MEDICINE CLINIC | Age: 67
End: 2022-10-03

## 2022-10-03 ENCOUNTER — TELEMEDICINE (OUTPATIENT)
Dept: PSYCHIATRY | Facility: CLINIC | Age: 67
End: 2022-10-03

## 2022-10-03 VITALS
WEIGHT: 139.4 LBS | DIASTOLIC BLOOD PRESSURE: 81 MMHG | SYSTOLIC BLOOD PRESSURE: 120 MMHG | HEART RATE: 129 BPM | HEIGHT: 64 IN | BODY MASS INDEX: 23.8 KG/M2

## 2022-10-03 DIAGNOSIS — R00.0 TACHYCARDIA: ICD-10-CM

## 2022-10-03 DIAGNOSIS — Z01.89 ENCOUNTER FOR LABORATORY EXAMINATION: ICD-10-CM

## 2022-10-03 DIAGNOSIS — F11.20 OPIOID TYPE DEPENDENCE, CONTINUOUS: ICD-10-CM

## 2022-10-03 DIAGNOSIS — Z76.89 ENCOUNTER TO ESTABLISH CARE: Primary | ICD-10-CM

## 2022-10-03 DIAGNOSIS — F11.20 OPIOID TYPE DEPENDENCE, CONTINUOUS: Primary | ICD-10-CM

## 2022-10-03 DIAGNOSIS — Z79.899 MEDICATION MANAGEMENT: ICD-10-CM

## 2022-10-03 DIAGNOSIS — I10 HYPERTENSION, UNSPECIFIED TYPE: ICD-10-CM

## 2022-10-03 LAB
EXTERNAL AMPHETAMINE SCREEN URINE: NEGATIVE
EXTERNAL BENZODIAZEPINE SCREEN URINE: NEGATIVE
EXTERNAL BUPRENORPHINE SCREEN URINE: POSITIVE
EXTERNAL COCAINE SCREEN URINE: NEGATIVE
EXTERNAL MDMA: NEGATIVE
EXTERNAL METHADONE SCREEN URINE: NEGATIVE
EXTERNAL METHAMPHETAMINE SCREEN URINE: NEGATIVE
EXTERNAL OPIATES SCREEN URINE: NEGATIVE
EXTERNAL OXYCODONE SCREEN URINE: POSITIVE
EXTERNAL THC SCREEN URINE: POSITIVE

## 2022-10-03 PROCEDURE — 99213 OFFICE O/P EST LOW 20 MIN: CPT | Performed by: NURSE PRACTITIONER

## 2022-10-03 PROCEDURE — 99214 OFFICE O/P EST MOD 30 MIN: CPT | Performed by: NURSE PRACTITIONER

## 2022-10-03 RX ORDER — BUPRENORPHINE HYDROCHLORIDE AND NALOXONE HYDROCHLORIDE DIHYDRATE 8; 2 MG/1; MG/1
2 TABLET SUBLINGUAL DAILY
Qty: 14 TABLET | Refills: 0 | Status: SHIPPED | OUTPATIENT
Start: 2022-10-03

## 2022-10-03 RX ORDER — BUPRENORPHINE HYDROCHLORIDE AND NALOXONE HYDROCHLORIDE DIHYDRATE 8; 2 MG/1; MG/1
2 TABLET SUBLINGUAL DAILY
Qty: 14 TABLET | Refills: 0 | Status: SHIPPED | OUTPATIENT
Start: 2022-10-03 | End: 2022-10-03 | Stop reason: SDUPTHER

## 2022-10-03 NOTE — PROGRESS NOTES
This provider is located at [facility]. The Patient is seen remotely located at the [facility] ([facility]) using Video. Patient is being seen via telehealth and confirm that they are in a secure environment for this session. The patient's condition being diagnosed/treated is appropriate for telemedicine. Provider identified as Champ Escalante as well as credentials APRN NIKOS FNP-C VANIA-WILLIAM.   The client/patient gave consent to be seen remotely, and when consent is given they understand that the consent allows for patient identifiable information to be sent to a third party as needed.   They may refuse to be seen remotely at any time. The electronic data is encrypted and password protected, and the patient has been advised of the potential risks to privacy not withstanding such measures.    Chief Complaint/History of Present Illness: Follow Up buprenorphine/naloxone Medicated Assisted Treatment for Opiate Use Disorder     Patient/Client Concerns/Updates: Continuing Elavil for anxiety and insomnia, patient reports continued struggle with Percocet, will increase Suboxone to 16-4 mg daily, patient also has Narcan on hand as prescribed by myself-I advised patient to keep Narcan on her person at all times; in the spirit of harm reduction I advised patient not to use any further opiates however in the event of a continued relapse and subsequent oversedation patient will have Narcan on hand; patient agrees higher levels of care required and agrees to take part in chemical dependency intensive outpatient    Triggers (Persons/Places/Things/Events/Thought/Emotions): Life stress    Cravings: Continued cravings for Percocet    Relapse Prevention: Patient to be screened for chemical dependency intensive outpatient tomorrow    Urine Drug Screen (today's visit) discussed: Positive buprenorphine positive THC and oxycodone    UDS Confirmation (Most recent/Resulted): Positive buprenorphine/nor-buprenorphine, no  concerns for urine tampering otherwise positive THC and metabolite for amitriptyline    Most recent pertinent laboratory studies reviewed: Patient has yet to report to lab despite request to do so    PAULETTE (PDMP) Reviewed for Current/Active Medications: buprenorphine/naloxone as reviewed today    Past Surgical History:  Past Surgical History:   Procedure Laterality Date   • GALLBLADDER SURGERY     • HYSTERECTOMY         Problem List:  There is no problem list on file for this patient.      Allergy:   Allergies   Allergen Reactions   • Penicillins         Current Medications:   Current Outpatient Medications   Medication Sig Dispense Refill   • amitriptyline (ELAVIL) 25 MG tablet Take 1 tablet by mouth Every Night. 30 tablet 5   • bisacodyl (bisacodyl) 5 MG EC tablet Take 1 tablet by mouth Daily As Needed for Constipation. 30 tablet 1   • buprenorphine-naloxone (SUBOXONE) 8-2 MG per SL tablet Place 2 tablets under the tongue Daily. 14 tablet 0   • cloNIDine (CATAPRES) 0.1 MG tablet Take 1 tablet by mouth 2 (Two) Times a Day As Needed (anxiety/insomnia/chills/sweats). 90 tablet 2   • docusate sodium (Colace) 100 MG capsule Take 1 capsule by mouth 2 (Two) Times a Day As Needed for Constipation. 60 capsule 1   • losartan (COZAAR) 100 MG tablet Take 10 mg by mouth Daily.     • omeprazole (priLOSEC) 20 MG capsule Take 1 capsule by mouth 2 (Two) Times a Day. 30 capsule 0   • ondansetron (Zofran) 4 MG tablet Take 2 tablets by mouth Every 8 (Eight) Hours As Needed for Nausea or Vomiting. 30 tablet 1   • polyethylene glycol (MIRALAX) 17 g packet Take 17 g by mouth Daily As Needed (constipation). Mix powder with at least 8 ounces of water 30 packet 2   • promethazine (PHENERGAN) 25 MG tablet Take 1 tablet by mouth Every 6 (Six) Hours As Needed for Nausea or Vomiting. 30 tablet 2   • rosuvastatin (CRESTOR) 20 MG tablet Take 20 mg by mouth Daily.     • cefdinir (OMNICEF) 300 MG capsule Take 1 capsule by mouth 2 (Two) Times a  Day. 20 capsule 0   • naloxone (NARCAN) 4 MG/0.1ML nasal spray Use 1 spray in the nostril as directed by provider As Needed (opiate over sedation). Use 1 spray in 1 nostril every 2 to 3 minutes. Call 911 2 each 2   • nitrofurantoin, macrocrystal-monohydrate, (MACROBID) 100 MG capsule Take 1 capsule by mouth 2 (Two) Times a Day. 14 capsule 0     No current facility-administered medications for this visit.       Past Medical History:  Past Medical History:   Diagnosis Date   • Arthritis    • Fibromyalgia    • H/O degenerative disc disease          Social History     Socioeconomic History   • Marital status:    Tobacco Use   • Smoking status: Current Some Day Smoker     Packs/day: 0.25     Years: 15.00     Pack years: 3.75     Types: Cigarettes   • Smokeless tobacco: Never Used   • Tobacco comment: pt states that she smokes an e-cig.   Vaping Use   • Vaping Use: Former   • Substances: Nicotine   • Devices: Disposable   Substance and Sexual Activity   • Alcohol use: No   • Drug use: Yes     Types: Hydrocodone, Benzodiazepines     Comment: formerly abused prescribed medications   • Sexual activity: Defer         Family History   Problem Relation Age of Onset   • Alcohol abuse Father    • Mental illness Maternal Grandfather          Mental Status Exam:   Hygiene:   good  Cooperation:  Cooperative  Eye Contact:  Good  Psychomotor Behavior:  Appropriate  Affect:  Appropriate  Mood: anxious  Speech:  Normal  Thought Process:  Goal directed  Thought Content:  Normal  Suicidal:  None  Homicidal:  None  Hallucinations:  None  Delusion:  None  Memory:  Deficits  Orientation:  Grossly intact  Reliability:  fair  Insight:  Fair  Judgement:  Poor  Impulse Control:  Poor         Review of Systems:  Review of Systems   Constitutional: Negative for activity change, chills, diaphoresis and fatigue.   Respiratory: Negative for apnea, cough and shortness of breath.    Cardiovascular: Negative for chest pain, palpitations and leg  "swelling.   Gastrointestinal: Negative for abdominal pain, constipation, diarrhea, nausea and vomiting.   Genitourinary: Negative for difficulty urinating.   Musculoskeletal: Negative for arthralgias.   Skin: Negative for rash.   Neurological: Negative for dizziness, weakness and headaches.   Psychiatric/Behavioral: Positive for dysphoric mood. Negative for agitation, self-injury, sleep disturbance and suicidal ideas. The patient is nervous/anxious.          Physical Exam:  Physical Exam  Vitals reviewed.   Constitutional:       General: She is not in acute distress.     Appearance: Normal appearance. She is not ill-appearing or toxic-appearing.   Pulmonary:      Effort: Pulmonary effort is normal.   Musculoskeletal:         General: Normal range of motion.   Neurological:      General: No focal deficit present.      Mental Status: She is alert and oriented to person, place, and time.   Psychiatric:         Attention and Perception: Attention and perception normal.         Mood and Affect: Mood is anxious and depressed.         Speech: Speech normal.         Behavior: Behavior normal. Behavior is cooperative.         Thought Content: Thought content normal.         Cognition and Memory: Cognition and memory normal.         Judgment: Judgment normal.         Vital Signs:   /81   Pulse (!) 129   Ht 162.6 cm (64.02\")   Wt 63.2 kg (139 lb 6.4 oz)   BMI 23.92 kg/m²      Lab Results:   Telemedicine on 10/03/2022   Component Date Value Ref Range Status   • External Amphetamine Screen Urine 10/03/2022 Negative   Final   • External Benzodiazepine Screen Uri* 10/03/2022 Negative   Final   • External Cocaine Screen Urine 10/03/2022 Negative   Final   • External THC Screen Urine 10/03/2022 Positive (A)  Final   • External Methadone Screen Urine 10/03/2022 Negative   Final   • External Methamphetamine Screen Ur* 10/03/2022 Negative   Final   • External Oxycodone Screen Urine 10/03/2022 Positive (A)  Final   • External " Buprenorphine Screen Urine 10/03/2022 Positive (A)  Final   • External MDMA 10/03/2022 Negative   Final   • External Opiates Screen Urine 10/03/2022 Negative   Final   Telemedicine on 09/13/2022   Component Date Value Ref Range Status   • External Amphetamine Screen Urine 09/13/2022 Negative   Final   • External Benzodiazepine Screen Uri* 09/13/2022 Negative   Final   • External Cocaine Screen Urine 09/13/2022 Negative   Final   • External THC Screen Urine 09/13/2022 Positive (A)  Final   • External Methadone Screen Urine 09/13/2022 Negative   Final   • External Methamphetamine Screen Ur* 09/13/2022 Negative   Final   • External Oxycodone Screen Urine 09/13/2022 Negative   Final   • External Buprenorphine Screen Urine 09/13/2022 Positive (A)  Final   • External MDMA 09/13/2022 Negative   Final   • External Opiates Screen Urine 09/13/2022 Negative   Final   Telemedicine on 09/06/2022   Component Date Value Ref Range Status   • External Amphetamine Screen Urine 09/06/2022 Negative   Final   • External Benzodiazepine Screen Uri* 09/06/2022 Negative   Final   • External Cocaine Screen Urine 09/06/2022 Negative   Final   • External THC Screen Urine 09/06/2022 Positive (A)  Final   • External Methadone Screen Urine 09/06/2022 Negative   Final   • External Methamphetamine Screen Ur* 09/06/2022 Negative   Final   • External Oxycodone Screen Urine 09/06/2022 Negative   Final   • External Buprenorphine Screen Urine 09/06/2022 Positive (A)  Final   • External MDMA 09/06/2022 Negative   Final   • External Opiates Screen Urine 09/06/2022 Negative   Final   Telemedicine on 08/23/2022   Component Date Value Ref Range Status   • External Amphetamine Screen Urine 08/23/2022 Negative   Final   • External Benzodiazepine Screen Uri* 08/23/2022 Negative   Final   • External Cocaine Screen Urine 08/23/2022 Negative   Final   • External THC Screen Urine 08/23/2022 Negative   Final   • External Methadone Screen Urine 08/23/2022 Negative    Final   • External Methamphetamine Screen Ur* 08/23/2022 Negative   Final   • External Oxycodone Screen Urine 08/23/2022 Positive (A)  Final   • External Buprenorphine Screen Urine 08/23/2022 Positive (A)  Final   • External MDMA 08/23/2022 Negative   Final   • External Opiates Screen Urine 08/23/2022 Negative   Final   Office Visit on 08/09/2022   Component Date Value Ref Range Status   • External Amphetamine Screen Urine 08/09/2022 Negative   Final-Edited   • External Benzodiazepine Screen Uri* 08/09/2022 Negative   Final-Edited   • External Cocaine Screen Urine 08/09/2022 Negative   Final-Edited   • External THC Screen Urine 08/09/2022 Positive (A)  Corrected   • External Methadone Screen Urine 08/09/2022 Negative   Final-Edited   • External Methamphetamine Screen Ur* 08/09/2022 Negative   Final-Edited   • External Oxycodone Screen Urine 08/09/2022 Negative   Final-Edited   • External Buprenorphine Screen Urine 08/09/2022 Negative   Corrected   • External MDMA 08/09/2022 Negative   Final-Edited   • External Opiates Screen Urine 08/09/2022 Negative   Final-Edited         Assessment & Plan   Diagnoses and all orders for this visit:    1. Opioid type dependence, continuous (HCC) (Primary)  -     buprenorphine-naloxone (SUBOXONE) 8-2 MG per SL tablet; Place 2 tablets under the tongue Daily.  Dispense: 14 tablet; Refill: 0    2. Medication management  -     [facility] Drug Screen        Visit Diagnoses:    ICD-10-CM ICD-9-CM   1. Opioid type dependence, continuous (HCC)  F11.20 304.01   2. Medication management  Z79.899 V58.69       PLAN:  1. Safety: No acute safety concerns  2. Risk Assessment: Risk of self-harm acutely is low. Risk of self-harm chronically is also low, but could be further elevated in the event of treatment noncompliance and/or AODA.    TREATMENT PLAN/GOALS: Continue supportive psychotherapy efforts and medications as indicated. Treatment and medication options discussed during today's visit. Patient  acknowledged and verbally consented to continue with current treatment plan and was educated on the importance of compliance with treatment and follow-up appointments.    MEDICATION ISSUES:  PAULETTE reviewed as expected.  Discussed medication options and treatment plan of prescribed medication as well as the risks, benefits, and side effects including potential falls, possible impaired driving and metabolic adversities among others. Patient is agreeable to call the office with any worsening of symptoms or onset of side effects. Patient is agreeable to call 911 or go to the nearest ER should he/she begin having SI/HI. No medication side effects or related complaints today.     MEDS ORDERED DURING VISIT:  New Medications Ordered This Visit   Medications   • buprenorphine-naloxone (SUBOXONE) 8-2 MG per SL tablet     Sig: Place 2 tablets under the tongue Daily.     Dispense:  14 tablet     Refill:  0     NADEAN:[id number]       No follow-ups on file.           This document has been electronically signed by JORI Miranda  October 3, 2022 10:05 EDT      Part of this note may be an electronic transcription/translation of spoken language to printed text using the Dragon Dictation System.

## 2022-10-03 NOTE — TELEPHONE ENCOUNTER
Abbi is asking that her medication be sent in to [facility]. She said her insurance will not pay if it is filled at [facility]. I have updated the pharmacy in her chart.

## 2022-10-05 NOTE — PROGRESS NOTES
Subjective   Abbi Franco is a 66 y.o. female.     History of Present Illness  Patient presents to the clinic today to establish care.  Patient is currently a participant in the MAT program at the [facility].  Patient denies acute illness.  Patient reports recently palpitations and elevated heart rate.  Denies chest pain, shortness of breath, numbness or tingling or diaphoresis.  Patient to have outpatient lab work drawn for baseline as well as a twelve-lead EKG.  Patient voices understanding and agrees with initial plan of care.    This provider is located in [facility] and is the established PCP for the primary care office within the [facility]. Pt is being seen today remotely through telehealth via ZOOM. Pt is being seen from personal location of choice and confirms that they are in a secure environment for this session. The patient's condition being diagnosed/treated is appropriate for telemedicine. Provider identified as JORI Talbert. Patient gave consent to be seen remotely. When consent is given, they understand that the consent allows for patient identifiable information to be sent to a third party as needed. They may refuse to be seen remotely at any time.The electronic data is encrypted and password protected, and the patient has been advised of the potential risks to privacy not withstanding those measures.         The following portions of the patient's history were reviewed and updated as appropriate: past family history, past social history and past surgical history.    Review of Systems   Cardiovascular: Positive for palpitations.   All other systems reviewed and are negative.    See history of Present Illness     Objective     Physical Exam   Constitutional: She appears well-developed and well-nourished.   HENT:   Head: Normocephalic.   Neck: Neck normal appearance.  Cardiovascular:       tachycardic   Pulmonary/Chest: Effort normal.   Abdominal: Abdomen appears normal.    Musculoskeletal: Normal range of motion.   Neurological: She is alert.   Psychiatric: She has a normal mood and affect.   Vitals reviewed.      PHQ-2/PHQ-9 Depression Screening 10/3/2022   Little Interest or Pleasure in Doing Things 0-->not at all   PHQ-9: Brief Depression Severity Measure Score 0       BMI is within normal parameters. No other follow-up for BMI required.   (Normal BMI:  18.5-24.9, OW 25-29.9, Obesity 30 or greater)      Assessment & Plan     Problems Addressed this Visit    None     Visit Diagnoses     Encounter to establish care    -  Primary    Relevant Orders    Lipid Panel    T4, Free    TSH    Vitamin B12    Folate    ECG 12 Lead    Hepatitis C Antibody    Tachycardia        Relevant Orders    Lipid Panel    T4, Free    TSH    Vitamin B12    Folate    ECG 12 Lead    Hepatitis C Antibody    Encounter for laboratory examination        Relevant Orders    Lipid Panel    T4, Free    TSH    Vitamin B12    Folate    Hepatitis C Antibody    Hypertension, unspecified type        Relevant Orders    Lipid Panel    ECG 12 Lead    Hepatitis C Antibody      Diagnoses       Codes Comments    Encounter to establish care    -  Primary ICD-10-CM: Z76.89  ICD-9-CM: V65.8     Tachycardia     ICD-10-CM: R00.0  ICD-9-CM: 785.0     Encounter for laboratory examination     ICD-10-CM: Z01.89  ICD-9-CM: V72.60     Hypertension, unspecified type     ICD-10-CM: I10  ICD-9-CM: 401.9           You have chosen to receive care through a telephone visit. Do you consent to use a telephone visit for your medical care today? Yes    This visit has been rescheduled as a phone visit to comply with patient safety concerns in accordance with CDC recommendations. Total time of discussion was 15   minutes.                 This document has been electronically signed by JORI Ladd  October 5, 2022 05:32 EDT    Part of this note may be an electronic transcription/translation of spoken language to printed text using the  [facility] Dictation System.

## 2023-03-07 ENCOUNTER — TRANSCRIBE ORDERS (OUTPATIENT)
Dept: ADMINISTRATIVE | Facility: HOSPITAL | Age: 68
End: 2023-03-07
Payer: MEDICARE

## 2023-03-07 DIAGNOSIS — Z12.31 SCREENING MAMMOGRAM FOR BREAST CANCER: Primary | ICD-10-CM

## 2023-03-07 DIAGNOSIS — Z78.0 ASYMPTOMATIC POSTMENOPAUSAL STATE: Primary | ICD-10-CM

## 2023-09-14 ENCOUNTER — APPOINTMENT (OUTPATIENT)
Dept: GENERAL RADIOLOGY | Facility: HOSPITAL | Age: 68
End: 2023-09-14
Payer: MEDICARE

## 2023-09-14 ENCOUNTER — HOSPITAL ENCOUNTER (EMERGENCY)
Facility: HOSPITAL | Age: 68
Discharge: OTHER INSTITUTION | End: 2023-09-15
Attending: STUDENT IN AN ORGANIZED HEALTH CARE EDUCATION/TRAINING PROGRAM
Payer: MEDICARE

## 2023-09-14 DIAGNOSIS — R56.9 SEIZURE: Primary | ICD-10-CM

## 2023-09-14 DIAGNOSIS — J18.9 PNEUMONIA DUE TO INFECTIOUS ORGANISM, UNSPECIFIED LATERALITY, UNSPECIFIED PART OF LUNG: ICD-10-CM

## 2023-09-14 LAB
ALBUMIN SERPL-MCNC: 4.3 G/DL (ref 3.5–5.2)
ALBUMIN/GLOB SERPL: 1.2 G/DL
ALP SERPL-CCNC: 98 U/L (ref 39–117)
ALT SERPL W P-5'-P-CCNC: 9 U/L (ref 1–33)
ANION GAP SERPL CALCULATED.3IONS-SCNC: 16.2 MMOL/L (ref 5–15)
AST SERPL-CCNC: 22 U/L (ref 1–32)
BASOPHILS # BLD AUTO: 0.03 10*3/MM3 (ref 0–0.2)
BASOPHILS NFR BLD AUTO: 0.2 % (ref 0–1.5)
BILIRUB SERPL-MCNC: 0.5 MG/DL (ref 0–1.2)
BUN SERPL-MCNC: 16 MG/DL (ref 8–23)
BUN/CREAT SERPL: 12.5 (ref 7–25)
CALCIUM SPEC-SCNC: 10 MG/DL (ref 8.6–10.5)
CHLORIDE SERPL-SCNC: 101 MMOL/L (ref 98–107)
CO2 SERPL-SCNC: 23.8 MMOL/L (ref 22–29)
CREAT SERPL-MCNC: 1.28 MG/DL (ref 0.57–1)
DEPRECATED RDW RBC AUTO: 39.8 FL (ref 37–54)
EGFRCR SERPLBLD CKD-EPI 2021: 46 ML/MIN/1.73
EOSINOPHIL # BLD AUTO: 0 10*3/MM3 (ref 0–0.4)
EOSINOPHIL NFR BLD AUTO: 0 % (ref 0.3–6.2)
ERYTHROCYTE [DISTWIDTH] IN BLOOD BY AUTOMATED COUNT: 12.9 % (ref 12.3–15.4)
ETHANOL BLD-MCNC: <10 MG/DL (ref 0–10)
ETHANOL UR QL: <0.01 %
GLOBULIN UR ELPH-MCNC: 3.6 GM/DL
GLUCOSE SERPL-MCNC: 189 MG/DL (ref 65–99)
HCT VFR BLD AUTO: 43 % (ref 34–46.6)
HGB BLD-MCNC: 14.4 G/DL (ref 12–15.9)
HOLD SPECIMEN: NORMAL
HOLD SPECIMEN: NORMAL
IMM GRANULOCYTES # BLD AUTO: 0.14 10*3/MM3 (ref 0–0.05)
IMM GRANULOCYTES NFR BLD AUTO: 0.7 % (ref 0–0.5)
INR PPP: 1.01 (ref 0.9–1.1)
LYMPHOCYTES # BLD AUTO: 0.8 10*3/MM3 (ref 0.7–3.1)
LYMPHOCYTES NFR BLD AUTO: 4.1 % (ref 19.6–45.3)
MAGNESIUM SERPL-MCNC: 2 MG/DL (ref 1.6–2.4)
MCH RBC QN AUTO: 28.6 PG (ref 26.6–33)
MCHC RBC AUTO-ENTMCNC: 33.5 G/DL (ref 31.5–35.7)
MCV RBC AUTO: 85.3 FL (ref 79–97)
MONOCYTES # BLD AUTO: 0.73 10*3/MM3 (ref 0.1–0.9)
MONOCYTES NFR BLD AUTO: 3.7 % (ref 5–12)
NEUTROPHILS NFR BLD AUTO: 17.88 10*3/MM3 (ref 1.7–7)
NEUTROPHILS NFR BLD AUTO: 91.3 % (ref 42.7–76)
NRBC BLD AUTO-RTO: 0 /100 WBC (ref 0–0.2)
PLATELET # BLD AUTO: 309 10*3/MM3 (ref 140–450)
PMV BLD AUTO: 10 FL (ref 6–12)
POTASSIUM SERPL-SCNC: 3.1 MMOL/L (ref 3.5–5.2)
PROT SERPL-MCNC: 7.9 G/DL (ref 6–8.5)
PROTHROMBIN TIME: 13.8 SECONDS (ref 12.1–14.7)
RBC # BLD AUTO: 5.04 10*6/MM3 (ref 3.77–5.28)
SODIUM SERPL-SCNC: 141 MMOL/L (ref 136–145)
WBC NRBC COR # BLD: 19.58 10*3/MM3 (ref 3.4–10.8)
WHOLE BLOOD HOLD COAG: NORMAL
WHOLE BLOOD HOLD SPECIMEN: NORMAL

## 2023-09-14 PROCEDURE — 0 LEVETIRACETAM IN NACL 0.75% 1000 MG/100ML SOLUTION: Performed by: STUDENT IN AN ORGANIZED HEALTH CARE EDUCATION/TRAINING PROGRAM

## 2023-09-14 PROCEDURE — 71045 X-RAY EXAM CHEST 1 VIEW: CPT | Performed by: RADIOLOGY

## 2023-09-14 PROCEDURE — 80053 COMPREHEN METABOLIC PANEL: CPT | Performed by: STUDENT IN AN ORGANIZED HEALTH CARE EDUCATION/TRAINING PROGRAM

## 2023-09-14 PROCEDURE — 99284 EMERGENCY DEPT VISIT MOD MDM: CPT

## 2023-09-14 PROCEDURE — 85025 COMPLETE CBC W/AUTO DIFF WBC: CPT | Performed by: STUDENT IN AN ORGANIZED HEALTH CARE EDUCATION/TRAINING PROGRAM

## 2023-09-14 PROCEDURE — 25010000002 ONDANSETRON PER 1 MG: Performed by: STUDENT IN AN ORGANIZED HEALTH CARE EDUCATION/TRAINING PROGRAM

## 2023-09-14 PROCEDURE — 25010000002 PROCHLORPERAZINE 10 MG/2ML SOLUTION: Performed by: STUDENT IN AN ORGANIZED HEALTH CARE EDUCATION/TRAINING PROGRAM

## 2023-09-14 PROCEDURE — 25010000002 LORAZEPAM PER 2 MG

## 2023-09-14 PROCEDURE — 71045 X-RAY EXAM CHEST 1 VIEW: CPT

## 2023-09-14 PROCEDURE — 82077 ASSAY SPEC XCP UR&BREATH IA: CPT | Performed by: STUDENT IN AN ORGANIZED HEALTH CARE EDUCATION/TRAINING PROGRAM

## 2023-09-14 PROCEDURE — 96375 TX/PRO/DX INJ NEW DRUG ADDON: CPT

## 2023-09-14 PROCEDURE — 85610 PROTHROMBIN TIME: CPT | Performed by: STUDENT IN AN ORGANIZED HEALTH CARE EDUCATION/TRAINING PROGRAM

## 2023-09-14 PROCEDURE — 83735 ASSAY OF MAGNESIUM: CPT | Performed by: STUDENT IN AN ORGANIZED HEALTH CARE EDUCATION/TRAINING PROGRAM

## 2023-09-14 PROCEDURE — 36415 COLL VENOUS BLD VENIPUNCTURE: CPT

## 2023-09-14 PROCEDURE — 25010000002 HYDRALAZINE PER 20 MG: Performed by: STUDENT IN AN ORGANIZED HEALTH CARE EDUCATION/TRAINING PROGRAM

## 2023-09-14 RX ORDER — ONDANSETRON 2 MG/ML
4 INJECTION INTRAMUSCULAR; INTRAVENOUS ONCE
Status: COMPLETED | OUTPATIENT
Start: 2023-09-14 | End: 2023-09-14

## 2023-09-14 RX ORDER — TRAMADOL HYDROCHLORIDE 50 MG/1
50 TABLET ORAL ONCE
Status: COMPLETED | OUTPATIENT
Start: 2023-09-14 | End: 2023-09-14

## 2023-09-14 RX ORDER — PANTOPRAZOLE SODIUM 40 MG/10ML
40 INJECTION, POWDER, LYOPHILIZED, FOR SOLUTION INTRAVENOUS ONCE
Status: COMPLETED | OUTPATIENT
Start: 2023-09-14 | End: 2023-09-14

## 2023-09-14 RX ORDER — LEVETIRACETAM 10 MG/ML
1000 INJECTION INTRAVASCULAR ONCE
Status: COMPLETED | OUTPATIENT
Start: 2023-09-14 | End: 2023-09-14

## 2023-09-14 RX ORDER — LOSARTAN POTASSIUM 25 MG/1
100 TABLET ORAL ONCE
Status: COMPLETED | OUTPATIENT
Start: 2023-09-14 | End: 2023-09-14

## 2023-09-14 RX ORDER — LORAZEPAM 2 MG/ML
INJECTION INTRAMUSCULAR
Status: COMPLETED
Start: 2023-09-14 | End: 2023-09-14

## 2023-09-14 RX ORDER — DOXYCYCLINE 100 MG/1
100 CAPSULE ORAL ONCE
Status: DISCONTINUED | OUTPATIENT
Start: 2023-09-14 | End: 2023-09-15 | Stop reason: HOSPADM

## 2023-09-14 RX ORDER — HYDRALAZINE HYDROCHLORIDE 20 MG/ML
10 INJECTION INTRAMUSCULAR; INTRAVENOUS ONCE
Status: COMPLETED | OUTPATIENT
Start: 2023-09-14 | End: 2023-09-14

## 2023-09-14 RX ORDER — SODIUM CHLORIDE 0.9 % (FLUSH) 0.9 %
10 SYRINGE (ML) INJECTION AS NEEDED
Status: DISCONTINUED | OUTPATIENT
Start: 2023-09-14 | End: 2023-09-15 | Stop reason: HOSPADM

## 2023-09-14 RX ORDER — CEFDINIR 300 MG/1
300 CAPSULE ORAL 2 TIMES DAILY
Qty: 14 CAPSULE | Refills: 0 | Status: SHIPPED | OUTPATIENT
Start: 2023-09-14 | End: 2023-09-15

## 2023-09-14 RX ORDER — HYDRALAZINE HYDROCHLORIDE 25 MG/1
25 TABLET, FILM COATED ORAL ONCE
Status: COMPLETED | OUTPATIENT
Start: 2023-09-14 | End: 2023-09-14

## 2023-09-14 RX ORDER — DOXYCYCLINE HYCLATE 100 MG/1
100 CAPSULE ORAL 2 TIMES DAILY
Qty: 14 CAPSULE | Refills: 0 | Status: SHIPPED | OUTPATIENT
Start: 2023-09-14 | End: 2023-09-15

## 2023-09-14 RX ORDER — PREDNISONE 20 MG/1
20 TABLET ORAL DAILY
Qty: 5 TABLET | Refills: 0 | Status: SHIPPED | OUTPATIENT
Start: 2023-09-14 | End: 2023-09-15

## 2023-09-14 RX ORDER — PROCHLORPERAZINE EDISYLATE 5 MG/ML
10 INJECTION INTRAMUSCULAR; INTRAVENOUS ONCE
Status: COMPLETED | OUTPATIENT
Start: 2023-09-14 | End: 2023-09-14

## 2023-09-14 RX ORDER — POTASSIUM CHLORIDE 20 MEQ/1
40 TABLET, EXTENDED RELEASE ORAL ONCE
Status: DISCONTINUED | OUTPATIENT
Start: 2023-09-14 | End: 2023-09-15 | Stop reason: HOSPADM

## 2023-09-14 RX ADMIN — PANTOPRAZOLE SODIUM 40 MG: 40 INJECTION, POWDER, FOR SOLUTION INTRAVENOUS at 21:21

## 2023-09-14 RX ADMIN — LOSARTAN POTASSIUM 100 MG: 25 TABLET, FILM COATED ORAL at 19:41

## 2023-09-14 RX ADMIN — HYDRALAZINE HYDROCHLORIDE 25 MG: 25 TABLET, FILM COATED ORAL at 19:41

## 2023-09-14 RX ADMIN — ONDANSETRON 4 MG: 2 INJECTION INTRAMUSCULAR; INTRAVENOUS at 21:20

## 2023-09-14 RX ADMIN — TRAMADOL HYDROCHLORIDE 50 MG: 50 TABLET, COATED ORAL at 19:41

## 2023-09-14 RX ADMIN — SODIUM CHLORIDE 2000 ML: 9 INJECTION, SOLUTION INTRAVENOUS at 21:40

## 2023-09-14 RX ADMIN — HYDRALAZINE HYDROCHLORIDE 10 MG: 20 INJECTION INTRAMUSCULAR; INTRAVENOUS at 21:37

## 2023-09-14 RX ADMIN — LEVETIRACETAM 1000 MG: 10 INJECTION INTRAVENOUS at 23:40

## 2023-09-14 RX ADMIN — LORAZEPAM 1 MG: 2 INJECTION INTRAMUSCULAR; INTRAVENOUS at 23:38

## 2023-09-14 RX ADMIN — PROCHLORPERAZINE EDISYLATE 10 MG: 5 INJECTION INTRAMUSCULAR; INTRAVENOUS at 19:30

## 2023-09-14 NOTE — PROGRESS NOTES
MEDICAL SCREENING:    Reason for Visit: seizure this am @[phone] to come to ER til now    Patient initially seen in triage.  The patient was advised further evaluation and diagnostic testing will be needed, some of the treatment and testing will be initiated in the lobby in order to begin the process.  The patient will be returned to the waiting area for the time being and possibly be re-assessed by a subsequent ED provider.  The patient will be brought back to the treatment area in as timely manner as possible.

## 2023-09-14 NOTE — Clinical Note
[facility] EMERGENCY DEPARTMENT  [address] [facility] [address]  Phone: [phone]    Abbi Franco was seen and treated in our emergency department on 9/14/2023.  She may return to school on 09/18/2023.          Thank you for choosing [facility].    Edwina Mcmullen RN

## 2023-09-15 VITALS
SYSTOLIC BLOOD PRESSURE: 197 MMHG | BODY MASS INDEX: 23.73 KG/M2 | TEMPERATURE: 98.7 F | HEART RATE: 100 BPM | RESPIRATION RATE: 20 BRPM | DIASTOLIC BLOOD PRESSURE: 111 MMHG | OXYGEN SATURATION: 98 % | WEIGHT: 139 LBS | HEIGHT: 64 IN

## 2023-09-15 PROCEDURE — 25010000002 LABETALOL 5 MG/ML SOLUTION: Performed by: STUDENT IN AN ORGANIZED HEALTH CARE EDUCATION/TRAINING PROGRAM

## 2023-09-15 PROCEDURE — 96365 THER/PROPH/DIAG IV INF INIT: CPT

## 2023-09-15 PROCEDURE — 25010000002 CEFTRIAXONE PER 250 MG: Performed by: STUDENT IN AN ORGANIZED HEALTH CARE EDUCATION/TRAINING PROGRAM

## 2023-09-15 PROCEDURE — 96376 TX/PRO/DX INJ SAME DRUG ADON: CPT

## 2023-09-15 PROCEDURE — 96375 TX/PRO/DX INJ NEW DRUG ADDON: CPT

## 2023-09-15 PROCEDURE — 25010000002 ONDANSETRON PER 1 MG

## 2023-09-15 RX ORDER — LABETALOL HYDROCHLORIDE 5 MG/ML
10 INJECTION, SOLUTION INTRAVENOUS ONCE
Status: COMPLETED | OUTPATIENT
Start: 2023-09-15 | End: 2023-09-15

## 2023-09-15 RX ORDER — ONDANSETRON 2 MG/ML
INJECTION INTRAMUSCULAR; INTRAVENOUS
Status: COMPLETED
Start: 2023-09-15 | End: 2023-09-15

## 2023-09-15 RX ORDER — ONDANSETRON 2 MG/ML
4 INJECTION INTRAMUSCULAR; INTRAVENOUS ONCE
Status: COMPLETED | OUTPATIENT
Start: 2023-09-15 | End: 2023-09-15

## 2023-09-15 RX ADMIN — ONDANSETRON 4 MG: 2 INJECTION INTRAMUSCULAR; INTRAVENOUS at 03:30

## 2023-09-15 RX ADMIN — CEFTRIAXONE 1000 MG: 1 INJECTION, POWDER, FOR SOLUTION INTRAMUSCULAR; INTRAVENOUS at 00:33

## 2023-09-15 RX ADMIN — LABETALOL HYDROCHLORIDE 10 MG: 5 INJECTION, SOLUTION INTRAVENOUS at 03:20

## 2023-09-15 NOTE — ED NOTES
Pts oxygen noted to be 86% on room air. Pt placed on 2L nasal cannula at this time. Provider Dr. Pop made aware.

## 2023-09-15 NOTE — ED NOTES
"Provider at pt's bedside. Pt ask about providing a urine sample. Pt stated \"no I can't get up.\" Pt was offered a bedpan and she stated \"no, I can't get up.\" Will reassess again in an hour. Pt verbalizes understanding.   "

## 2023-09-15 NOTE — ED NOTES
Called to pt's bedside for a possible seizure. Provider, Dr. Pop, at pt's bedside. Upon entering pt's room, pt having uncontrollable jerky movements and blank stare. Pt noted to have wet the bed. New orders placed for 1 mg Ativan and 1 g of Keppra IV.

## 2023-09-15 NOTE — ED NOTES
Liliya called back with [facility]. Patient is going to room 481 bed 1. Number for report is [phone] fax number for face sheet is [phone].

## 2023-09-15 NOTE — ED NOTES
"Informed pt on need of urine sample, pt states \"No, I can't pee right now.\" Will reassess later.   "

## 2023-09-15 NOTE — ED NOTES
Called house supervisor Paolo for in house truck transport to [facility]. She approved run, truck is on a current run and will be back shortly.

## 2023-09-15 NOTE — ED NOTES
Patient noted to have mild body tremors, blowing resps, and not responding to verbal stimuli. Seizure burden on Ceribell noted to be 73%. Provider and Primary Nurse at bedside.

## 2023-09-15 NOTE — DISCHARGE INSTRUCTIONS
Call one of the offices below to establish a primary care provider.  If you are unable to get an appointment and feel it is an emergency and need to be seen immediately please return to the Emergency Department.    Call one of the office below to set up a primary care provider.    Dr. Daryn Jaimes                                                                                                       [address] [facility] [address]  [phone]    Dr. Martinez, Dr. BLANCA Simon, Dr. VENU Simon ([facility])  [address] [facility] [address]  [phone]    Dr. Pablo, Dr. Mcdowell, Dr. Moody ([facility])  [address] [facility] [address]  [phone]    Dr. Michael  [address] [facility] [address]  [phone]    Dr. Hayes, Dr. Boateng, Dr. Coto, Dr. Desouza ([facility])  [address] [place] [address] [address]  [facility] [address]  [phone]    Dr. Lacey Lazo  [address] [facility] [place] [address]  [phone]    Dr. Evelyn Seymour  [address]   [address]  [facility] [address]  [phone]    Dr. Jaimes  [address] [facility] [address]  [phone]    Dr. Cedeno, Dr. De La Cruz  [address] [facility] [place] [address]  [phone]    Dr. Sharp  [address] [address]                                                              [address]  [facility] [address]  [phone]    Dr. Pop  [address] [facility] [address]  [phone]    Dr. Jerri Wilkins  [address]  [address]  [place] [address]  [phone]    Dr. Bell and [facility]   [address] [facility]  [address]  [place] [address]  [phone]

## 2023-09-15 NOTE — ED PROVIDER NOTES
Subjective   History of Present Illness  Patient is a 67-year-old female with history significant for seizure disorder and hypertension who comes to the ER after having a seizure this morning around 7 AM.   states they were in [place] when she had a tonic-clonic seizure, had a second 1 at 830.  They drove back home without any further issue.  She had been in [place] without her Keppra for the past week and missed a weeks worth of doses.  She did take her a.m. dose when she got home.  She also did not take her BP meds today.  She denies any chest pain/pressure or tightness.  No shortness of breath.  Nuys any fevers or chills.  Says she felt at baseline last night prior to the episode this morning.    ---  Patient was diagnosed with epilepsy in February in [place].  Was started on 500 milligrams of Keppra twice daily and discharged after 2 and half day hospital stay.  Has been does not recall EEG or advanced imaging.  She was supposed to follow-up with a neurologist but had not been able to get an appointment.  To his knowledge she had no further episodes of seizure activity when she was compliant with Keppra.    Review of Systems   Constitutional:  Negative for chills, fatigue and fever.   HENT:  Negative for ear pain, sinus pain and sore throat.    Respiratory:  Negative for cough, chest tightness, shortness of breath and wheezing.    Cardiovascular:  Negative for chest pain, palpitations and leg swelling.   Gastrointestinal:  Negative for abdominal pain, constipation, diarrhea, nausea and vomiting.   Genitourinary:  Negative for dysuria, hematuria and urgency.   Musculoskeletal:  Negative for arthralgias and myalgias.   Neurological:  Negative for dizziness, syncope and light-headedness.   Psychiatric/Behavioral:  Negative for confusion.      Past Medical History:   Diagnosis Date    Arthritis     Fibromyalgia     H/O degenerative disc disease        Allergies   Allergen Reactions    Penicillins         Past Surgical History:   Procedure Laterality Date    GALLBLADDER SURGERY      HYSTERECTOMY         Family History   Problem Relation Age of Onset    Alcohol abuse Father     Mental illness Maternal Grandfather        Social History     Socioeconomic History    Marital status:    Tobacco Use    Smoking status: Some Days     Packs/day: 0.25     Years: 15.00     Pack years: 3.75     Types: Cigarettes    Smokeless tobacco: Never    Tobacco comments:     pt states that she smokes an e-cig.   Vaping Use    Vaping Use: Former    Substances: Nicotine    Devices: Disposable   Substance and Sexual Activity    Alcohol use: No    Drug use: Yes     Types: Hydrocodone, Benzodiazepines     Comment: formerly abused prescribed medications    Sexual activity: Defer           Objective   Physical Exam  Vitals and nursing note reviewed. Exam conducted with a chaperone present.   Constitutional:       Appearance: Normal appearance. She is normal weight.   HENT:      Head: Normocephalic and atraumatic.      Nose: Nose normal.      Mouth/Throat:      Mouth: Mucous membranes are moist.      Pharynx: Oropharynx is clear.   Eyes:      Extraocular Movements: Extraocular movements intact.      Conjunctiva/sclera: Conjunctivae normal.      Pupils: Pupils are equal, round, and reactive to light.   Cardiovascular:      Rate and Rhythm: Normal rate and regular rhythm.      Pulses: Normal pulses.      Heart sounds: Normal heart sounds.   Pulmonary:      Effort: Pulmonary effort is normal.      Breath sounds: Normal breath sounds.   Abdominal:      General: Bowel sounds are normal.      Palpations: Abdomen is soft.   Musculoskeletal:         General: Normal range of motion.      Cervical back: Normal range of motion and neck supple.   Skin:     General: Skin is warm and dry.      Capillary Refill: Capillary refill takes less than 2 seconds.   Neurological:      General: No focal deficit present.      Mental Status: She is alert and  oriented to person, place, and time.   Psychiatric:         Mood and Affect: Mood normal.         Behavior: Behavior normal.       Procedures           ED Course                                           Medical Decision Making  --Patient is hemodynamically stable, on room air.  Typical tonic-clonic seizure after missing a weeks worth of Keppra.  Alert and orient x4.  Work-up noted for leukocytosis with left shift.  X-ray consistent with pneumonia, likely aspiration given seizure event.  --Empirically cover with p.o. antibiotics, instructed the importance of Keppra compliance, patient stated she felt at baseline and was ready for DC home, recommend follow-up    ---------------    --Prior to d/c patient had a legitimate tonic-clonic seizure lasting approximately 30 seconds, extension of the upper extremities and arching of the back, loss control of urine as well.  --Seizure aborted with 1 mg of IV Ativan, IV 1 g Keppra load  --These were confirmed with ceribell 100% seizure burden  --No CCU bed for in-house monitoring, discussed with Tele neurology  --Contacted [facility], wait listed  --no beds available at   --wait listed at [facility]- Dr. Luevano  --accepted in consult by Dr. Shook at [facility], d/w Dr. Tyler, accepted, transfer arranged    Problems Addressed:  Pneumonia due to infectious organism, unspecified laterality, unspecified part of lung: complicated acute illness or injury  Seizure: complicated acute illness or injury    Amount and/or Complexity of Data Reviewed  Labs: ordered.  Radiology: ordered.    Risk  Prescription drug management.        Final diagnoses:   Seizure   Pneumonia due to infectious organism, unspecified laterality, unspecified part of lung       ED Disposition  ED Disposition       ED Disposition   Transfer to Another Facility     Condition   --    Comment   --               No follow-up provider specified.         Medication List      No changes were made to your  prescriptions during this visit.            Kyle Pop, DO  09/14/23 2249       Kyle Pop, DO  09/15/23 0213       Kyle Pop, DO  09/15/23 0214

## 2023-09-15 NOTE — ED NOTES
Called [facility]'s spoke with Andrea for transfer. He will call us back with a  doctor shortly.

## 2023-09-26 ENCOUNTER — TRANSCRIBE ORDERS (OUTPATIENT)
Dept: ADMINISTRATIVE | Facility: HOSPITAL | Age: 68
End: 2023-09-26
Payer: MEDICARE

## 2023-09-26 DIAGNOSIS — Z78.0 ASYMPTOMATIC MENOPAUSAL STATE: Primary | ICD-10-CM

## 2023-09-27 ENCOUNTER — OFFICE VISIT (OUTPATIENT)
Dept: CARDIOLOGY | Facility: CLINIC | Age: 68
End: 2023-09-27
Payer: MEDICARE

## 2023-09-27 VITALS
DIASTOLIC BLOOD PRESSURE: 87 MMHG | HEART RATE: 90 BPM | OXYGEN SATURATION: 99 % | WEIGHT: 153 LBS | HEIGHT: 64 IN | SYSTOLIC BLOOD PRESSURE: 140 MMHG | BODY MASS INDEX: 26.12 KG/M2

## 2023-09-27 DIAGNOSIS — R93.1 ABNORMAL ECHOCARDIOGRAM: ICD-10-CM

## 2023-09-27 DIAGNOSIS — I25.2 OLD MI (MYOCARDIAL INFARCTION): Primary | ICD-10-CM

## 2023-09-27 PROCEDURE — 99204 OFFICE O/P NEW MOD 45 MIN: CPT | Performed by: INTERNAL MEDICINE

## 2023-09-27 PROCEDURE — 93000 ELECTROCARDIOGRAM COMPLETE: CPT | Performed by: INTERNAL MEDICINE

## 2023-09-27 RX ORDER — ASPIRIN 81 MG/1
81 TABLET ORAL DAILY
Qty: 30 TABLET | Refills: 2 | Status: SHIPPED | OUTPATIENT
Start: 2023-09-27

## 2023-09-27 NOTE — PROGRESS NOTES
Provider, No Known  Abbi Franco  1955 09/27/2023    There is no problem list on file for this patient.      Dear Provider, No Known:    Subjective     Abbi Franco is a 67 y.o. female with the problems as listed above, presents    Chief complaint: Here for cardiac evaluation after finding leaky valve on recent echo Doppler study at [facility].    History of Present Illness: Mr. Franco is a pleasant 67-year-old  female with history of seizures, was evaluated at [facility] recently for an episode of seizure.  At that time she apparently underwent echo Doppler study and was told that she has leaky valve. She was recommended to see a cardiologist.  Hence she is here today for the same.      On further questioning she denies any complaints of shortness of breath, leg edema.  She denies any history of rheumatic heart disease.  She is not known to have any previous history of heart murmurs.  She denies any chest pains, palpitations, dizziness or syncope.  She states her blood pressure at home usually runs around 130 on the top and 80s on the bottom.    Allergies   Allergen Reactions    Penicillins    :    Current Outpatient Medications:     losartan (COZAAR) 100 MG tablet, Take 25 mg by mouth Daily., Disp: , Rfl:     amitriptyline (ELAVIL) 25 MG tablet, Take 1 tablet by mouth Every Night., Disp: 30 tablet, Rfl: 5    buprenorphine-naloxone (SUBOXONE) 8-2 MG per SL tablet, Place 2 tablets under the tongue Daily., Disp: 14 tablet, Rfl: 0    cloNIDine (CATAPRES) 0.1 MG tablet, Take 1 tablet by mouth 2 (Two) Times a Day As Needed (anxiety/insomnia/chills/sweats)., Disp: 90 tablet, Rfl: 2    docusate sodium (Colace) 100 MG capsule, Take 1 capsule by mouth 2 (Two) Times a Day As Needed for Constipation., Disp: 60 capsule, Rfl: 1    naloxone (NARCAN) 4 MG/0.1ML nasal spray, Use 1 spray in the nostril as directed by provider As Needed (opiate over sedation). Use 1 spray in 1 nostril  every 2 to 3 minutes. Call 911, Disp: 2 each, Rfl: 2    omeprazole (priLOSEC) 20 MG capsule, Take 1 capsule by mouth 2 (Two) Times a Day., Disp: 30 capsule, Rfl: 0    ondansetron (Zofran) 4 MG tablet, Take 2 tablets by mouth Every 8 (Eight) Hours As Needed for Nausea or Vomiting., Disp: 30 tablet, Rfl: 1    polyethylene glycol (MIRALAX) 17 g packet, Take 17 g by mouth Daily As Needed (constipation). Mix powder with at least 8 ounces of water, Disp: 30 packet, Rfl: 2    promethazine (PHENERGAN) 25 MG tablet, Take 1 tablet by mouth Every 6 (Six) Hours As Needed for Nausea or Vomiting., Disp: 30 tablet, Rfl: 2    rosuvastatin (CRESTOR) 20 MG tablet, Take 20 mg by mouth Daily., Disp: , Rfl:     Past Medical History:   Diagnosis Date    Arthritis     Fibromyalgia     H/O degenerative disc disease      Past Surgical History:   Procedure Laterality Date    GALLBLADDER SURGERY      HYSTERECTOMY       Family History   Problem Relation Age of Onset    Alcohol abuse Father     Mental illness Maternal Grandfather      Social History     Tobacco Use    Smoking status: Some Days     Packs/day: 0.25     Years: 15.00     Pack years: 3.75     Types: Cigarettes    Smokeless tobacco: Never    Tobacco comments:     pt states that she smokes an e-cig.   Vaping Use    Vaping Use: Former    Substances: Nicotine    Devices: Disposable   Substance Use Topics    Alcohol use: No    Drug use: Yes     Types: Hydrocodone, Benzodiazepines     Comment: formerly abused prescribed medications     Review of Systems   Constitutional: Negative.   HENT: Negative.     Eyes: Negative.    Cardiovascular:  Positive for palpitations.   Respiratory:  Positive for shortness of breath.    Endocrine: Negative.    Hematologic/Lymphatic: Negative.    Skin:  Positive for itching and rash.   Musculoskeletal:  Positive for joint pain, joint swelling, muscle cramps, muscle weakness, myalgias and stiffness.   Gastrointestinal: Negative.    Genitourinary: Negative.   "  Neurological: Negative.    Psychiatric/Behavioral:  Positive for memory loss.      Objective   Blood pressure 140/87, pulse 90, height 162.6 cm (64\"), weight 69.4 kg (153 lb), SpO2 99 %.  Body mass index is 26.26 kg/m².    Vitals reviewed.   Constitutional:       Appearance: Well-developed.   Eyes:      Conjunctiva/sclera: Conjunctivae normal.   HENT:      Head: Normocephalic.   Neck:      Thyroid: No thyromegaly.      Vascular: No JVD.      Trachea: No tracheal deviation.   Pulmonary:      Effort: No respiratory distress.      Breath sounds: Normal breath sounds. No wheezing. No rales.   Cardiovascular:      PMI at left midclavicular line. Normal rate. Regular rhythm. Normal S1. Normal S2.       Murmurs: There is no murmur.      No gallop.  No click. No rub.   Pulses:     Intact distal pulses.   Edema:     Peripheral edema absent.   Abdominal:      General: Bowel sounds are normal.      Palpations: Abdomen is soft. There is no abdominal mass.      Tenderness: There is no abdominal tenderness.   Musculoskeletal:      Cervical back: Normal range of motion and neck supple. Skin:     General: Skin is warm and dry.   Neurological:      Mental Status: Alert and oriented to person, place, and time.      Cranial Nerves: No cranial nerve deficit.       Lab Results   Component Value Date     09/14/2023    K 4.0 09/19/2023     09/14/2023    CO2 23.8 09/14/2023    BUN 16 09/14/2023    CREATININE 1.28 (H) 09/14/2023    GLUCOSE 189 (H) 09/14/2023    CALCIUM 10.0 09/14/2023    AST 22 09/14/2023    ALT 9 09/14/2023    ALKPHOS 98 09/14/2023     Lab Results   Component Value Date    CKTOTAL 1,713 (H) 09/18/2023     Lab Results   Component Value Date    WBC 19.58 (H) 09/14/2023    HGB 14.4 09/14/2023    HCT 43.0 09/14/2023     09/14/2023     Lab Results   Component Value Date    INR 1.01 09/14/2023     Lab Results   Component Value Date    MG 2.1 09/18/2023       ECG 12 Lead    Date/Time: 9/27/2023 11:30 " AM  Performed by: Quintin Godoy MD  Authorized by: Quintin Godoy MD   Comparison: compared with previous ECG from 5/19/2021  Comparison to previous ECG: EKG today shows evidence of possible old anterolateral myocardial infarction as compared to the previous EKG.  Rhythm: sinus rhythm  Comments: Possible old anterolateral myocardial infarction of undetermined age.          Assessment & Plan    Diagnosis Plan   1. Old MI (myocardial infarction) (possible old anterolateral myocardial infarction by EKG criteria)        2. Abnormal echocardiogram            Recommendations  Orders Placed This Encounter   Procedures    ECG 12 Lead      I will try to obtain the recent echo Doppler study results from [facility] to see if she has any wall motion normalities and valvular function abnormalities and make further recommendations accordingly.  For now would  enteric-coated aspirin 81 mg daily.  Since patient is totally asymptomatic from coronary standpoint and if the LV wall motion on the echo Doppler study is normal then she may not need ischemic evaluation although her EKG reads as previous anterolateral MI.    Return in about 2 months (around 11/27/2023).    As always, Provider, No Known  I appreciate very much the opportunity to participate in the cardiovascular care of your patients. Please do not hesitate to call me with any questions with regards to Abbi Franco's evaluation and management.     With Best Regards,        Quintin Godoy MD, [facility]    Please note that portions of this note were completed with a voice recognition program.

## 2023-11-28 ENCOUNTER — OFFICE VISIT (OUTPATIENT)
Dept: CARDIOLOGY | Facility: CLINIC | Age: 68
End: 2023-11-28
Payer: MEDICARE

## 2023-11-28 VITALS
SYSTOLIC BLOOD PRESSURE: 127 MMHG | HEIGHT: 64 IN | BODY MASS INDEX: 26.94 KG/M2 | WEIGHT: 157.8 LBS | OXYGEN SATURATION: 98 % | DIASTOLIC BLOOD PRESSURE: 82 MMHG | HEART RATE: 89 BPM

## 2023-11-28 DIAGNOSIS — E78.5 DYSLIPIDEMIA: ICD-10-CM

## 2023-11-28 DIAGNOSIS — I35.0 AORTIC STENOSIS, MODERATE: Primary | ICD-10-CM

## 2023-11-28 DIAGNOSIS — I10 ESSENTIAL HYPERTENSION: ICD-10-CM

## 2023-11-28 PROCEDURE — 1159F MED LIST DOCD IN RCRD: CPT | Performed by: PHYSICIAN ASSISTANT

## 2023-11-28 PROCEDURE — 99214 OFFICE O/P EST MOD 30 MIN: CPT | Performed by: PHYSICIAN ASSISTANT

## 2023-11-28 PROCEDURE — 1160F RVW MEDS BY RX/DR IN RCRD: CPT | Performed by: PHYSICIAN ASSISTANT

## 2023-11-28 PROCEDURE — 3079F DIAST BP 80-89 MM HG: CPT | Performed by: PHYSICIAN ASSISTANT

## 2023-11-28 PROCEDURE — 3074F SYST BP LT 130 MM HG: CPT | Performed by: PHYSICIAN ASSISTANT

## 2023-11-28 RX ORDER — NICOTINE 21-14-7MG
1 KIT TRANSDERMAL DAILY
Qty: 56 EACH | Refills: 0 | Status: SHIPPED | OUTPATIENT
Start: 2023-11-28

## 2023-11-28 RX ORDER — METOPROLOL SUCCINATE 25 MG/1
25 TABLET, EXTENDED RELEASE ORAL DAILY
Qty: 30 TABLET | Refills: 3 | Status: SHIPPED | OUTPATIENT
Start: 2023-11-28

## 2023-11-28 NOTE — PROGRESS NOTES
System, Provider Not In  Abbi Franco  1955 11/28/2023    Patient Active Problem List   Diagnosis    Aortic stenosis, moderate    Essential hypertension    Dyslipidemia       Dear System, Provider Not In:    Subjective     History of Present Illness:    Chief Complaint   Patient presents with    Follow-up     routine       Abbi Franco is a pleasant 67 y.o. female with a past medical history significant for moderate aortic stenosis, moderate concentric hypertrophy of left ventricle, essential hypertension, dyslipidemia.  She also has history of seizure disorder now following with neurology recent hospitalization at [facility] following recurrent seizures.  MRI of the brain did show posterior reversible encephalopathy syndrome and also had acute T12 compression fracture with 50% loss of vertebral body.  She comes in today for cardiology follow-up regarding echocardiogram done during her hospitalization.    I do not have echocardiogram which did show moderate AAS with moderate concentric hypertrophy with normal LVEF.  Clinically she denies any cardiac symptoms that are concerning today such as chest pains, dyspnea, orthopnea, palpitations, dizziness, or syncope.  She does report she has a follow-up with neurology  for her seizure disorder and abnormal MRI of the brain.  She also is following with orthopedics although she admits she has not been wearing her brace that was recommended at discharge at [facility] for her compression fracture.  She does report to me that she continues to smoke tobacco 2 packs a day.                        Allergies   Allergen Reactions    Penicillins    :      Current Outpatient Medications:     amitriptyline (ELAVIL) 25 MG tablet, Take 1 tablet by mouth Every Night., Disp: 30 tablet, Rfl: 5    aspirin 81 MG EC tablet, Take 1 tablet by mouth Daily., Disp: 30 tablet, Rfl: 2    buprenorphine-naloxone (SUBOXONE) 8-2 MG per SL tablet, Place 2 tablets under the  tongue Daily., Disp: 14 tablet, Rfl: 0    cloNIDine (CATAPRES) 0.1 MG tablet, Take 1 tablet by mouth 2 (Two) Times a Day As Needed (anxiety/insomnia/chills/sweats)., Disp: 90 tablet, Rfl: 2    docusate sodium (Colace) 100 MG capsule, Take 1 capsule by mouth 2 (Two) Times a Day As Needed for Constipation., Disp: 60 capsule, Rfl: 1    losartan (COZAAR) 25 MG tablet, Take 1 tablet by mouth Daily., Disp: , Rfl:     naloxone (NARCAN) 4 MG/0.1ML nasal spray, Use 1 spray in the nostril as directed by provider As Needed (opiate over sedation). Use 1 spray in 1 nostril every 2 to 3 minutes. Call 911, Disp: 2 each, Rfl: 2    omeprazole (priLOSEC) 20 MG capsule, Take 1 capsule by mouth 2 (Two) Times a Day., Disp: 30 capsule, Rfl: 0    polyethylene glycol (MIRALAX) 17 g packet, Take 17 g by mouth Daily As Needed (constipation). Mix powder with at least 8 ounces of water, Disp: 30 packet, Rfl: 2    rosuvastatin (CRESTOR) 20 MG tablet, Take 1 tablet by mouth Daily., Disp: , Rfl:     metoprolol succinate XL (TOPROL-XL) 25 MG 24 hr tablet, Take 1 tablet by mouth Daily., Disp: 30 tablet, Rfl: 3    Nicotine 21-14-7 MG/24HR kit, Place 1 patch on the skin as directed by provider Daily., Disp: 56 each, Rfl: 0    ondansetron (Zofran) 4 MG tablet, Take 2 tablets by mouth Every 8 (Eight) Hours As Needed for Nausea or Vomiting. (Patient not taking: Reported on 11/28/2023), Disp: 30 tablet, Rfl: 1    promethazine (PHENERGAN) 25 MG tablet, Take 1 tablet by mouth Every 6 (Six) Hours As Needed for Nausea or Vomiting. (Patient not taking: Reported on 11/28/2023), Disp: 30 tablet, Rfl: 2    The following portions of the patient's history were reviewed and updated as appropriate: allergies, current medications, past family history, past medical history, past social history, past surgical history and problem list.    Social History     Tobacco Use    Smoking status: Some Days     Packs/day: 0.25     Years: 15.00     Additional pack years: 0.00      "Total pack years: 3.75     Types: Cigarettes    Smokeless tobacco: Never    Tobacco comments:     pt states that she smokes an e-cig.   Vaping Use    Vaping Use: Former    Substances: Nicotine    Devices: Disposable   Substance Use Topics    Alcohol use: No    Drug use: Yes     Types: Hydrocodone, Benzodiazepines     Comment: formerly abused prescribed medications         Objective   Vitals:    11/28/23 0857   BP: 127/82   Pulse: 89   SpO2: 98%   Weight: 71.6 kg (157 lb 12.8 oz)   Height: 162.6 cm (64\")     Body mass index is 27.09 kg/m².    ROS    Constitutional:       General: Not in acute distress.     Appearance: Healthy appearance. Well-developed and not in distress. Not diaphoretic.   Eyes:      Conjunctiva/sclera: Conjunctivae normal.      Pupils: Pupils are equal, round, and reactive to light.   HENT:      Head: Normocephalic and atraumatic.   Neck:      Vascular: No carotid bruit or JVD.   Pulmonary:      Effort: Pulmonary effort is normal. No respiratory distress.      Breath sounds: Normal breath sounds.   Cardiovascular:      Normal rate. Regular rhythm.      Murmurs: There is a grade 2/6 harsh midsystolic murmur at the URSB, radiating to the neck.   Edema:     Peripheral edema absent.   Skin:     General: Skin is cool.   Neurological:      Mental Status: Alert, oriented to person, place, and time and oriented to person, place and time.         Lab Results   Component Value Date     09/14/2023    K 4.0 09/19/2023     09/14/2023    CO2 23.8 09/14/2023    BUN 16 09/14/2023    CREATININE 1.28 (H) 09/14/2023    GLUCOSE 189 (H) 09/14/2023    CALCIUM 10.0 09/14/2023    AST 22 09/14/2023    ALT 9 09/14/2023    ALKPHOS 98 09/14/2023     Lab Results   Component Value Date    CKTOTAL 1,713 (H) 09/18/2023     Lab Results   Component Value Date    WBC 19.58 (H) 09/14/2023    HGB 14.4 09/14/2023    HCT 43.0 09/14/2023     09/14/2023     Lab Results   Component Value Date    INR 1.01 09/14/2023 " "    Lab Results   Component Value Date    MG 2.1 09/18/2023     No results found for: \"TSH\", \"PSA\", \"CHLPL\", \"TRIG\", \"HDL\", \"LDL\"   No results found for: \"BNP\"    During this visit the following were done:  Labs Reviewed []    Labs Ordered []    Radiology Reports Reviewed []    Radiology Ordered []    PCP Records Reviewed []    Referring Provider Records Reviewed []    ER Records Reviewed []    Hospital Records Reviewed [x]    History Obtained From Family []    Radiology Images Reviewed []    Other Reviewed []    Records Requested []       Procedures    Assessment & Plan    Diagnosis Plan   1. Aortic stenosis, moderate        2. Dyslipidemia        3. Essential hypertension                 Recommendations:  Moderate aortic stenosis  Explained to her that this will require routine monitoring currently nothing further to do other than improving risk factors.  Emphasized the importance of tobacco cessation and blood pressure control.  Blood pressure is well-controlled today but she continues to smoke.  I did prescribe nicotine patches.  Moderate concentric hypertrophy  Again will require monitoring but ultimately just needs to improve risk factors such as hypertension and tobacco abuse.  I will prescribe her metoprolol succinate 25 mg daily  Essential hypertension  Controlled.    Return in about 3 months (around 2/28/2024).    As always, I appreciate very much the opportunity to participate in the cardiovascular care of your patients.      With Best Regards,    Tio Hector PA-C          "

## 2025-08-12 ENCOUNTER — TRANSCRIBE ORDERS (OUTPATIENT)
Dept: ADMINISTRATIVE | Facility: HOSPITAL | Age: 70
End: 2025-08-12
Payer: MEDICARE

## 2025-08-12 DIAGNOSIS — G40.219 LOCALIZATION-RELATED (FOCAL) (PARTIAL) SYMPTOMATIC EPILEPSY AND EPILEPTIC SYNDROMES WITH COMPLEX PARTIAL SEIZURES, INTRACTABLE, WITHOUT STATUS EPILEPTICUS: Primary | ICD-10-CM

## 2025-08-22 ENCOUNTER — HOSPITAL ENCOUNTER (OUTPATIENT)
Dept: MRI IMAGING | Facility: HOSPITAL | Age: 70
Discharge: HOME | End: 2025-08-22
Payer: MEDICARE

## 2025-08-22 DIAGNOSIS — G40.219 LOCALIZATION-RELATED (FOCAL) (PARTIAL) SYMPTOMATIC EPILEPSY AND EPILEPTIC SYNDROMES WITH COMPLEX PARTIAL SEIZURES, INTRACTABLE, WITHOUT STATUS EPILEPTICUS: ICD-10-CM

## 2025-08-22 PROCEDURE — 70551 MRI BRAIN STEM W/O DYE: CPT
